# Patient Record
Sex: MALE | Race: BLACK OR AFRICAN AMERICAN | Employment: OTHER | URBAN - METROPOLITAN AREA
[De-identification: names, ages, dates, MRNs, and addresses within clinical notes are randomized per-mention and may not be internally consistent; named-entity substitution may affect disease eponyms.]

---

## 2019-06-13 ENCOUNTER — HOSPITAL ENCOUNTER (OUTPATIENT)
Dept: PHYSICAL THERAPY | Age: 74
Discharge: HOME | End: 2019-06-13
Payer: MEDICARE

## 2019-06-13 PROCEDURE — 97165 OT EVAL LOW COMPLEX 30 MIN: CPT

## 2019-06-13 PROCEDURE — 97161 PT EVAL LOW COMPLEX 20 MIN: CPT

## 2019-06-13 NOTE — PROGRESS NOTES
[facility] PHYSICAL THERAPY  [address] [place] [address] Eileen Finley, [address] - Phone: [phone]  Fax: [phone] / [address] [address] PHYSICAL THERAPY SERVICES  Patient Name: Jerlene Schaumann : 1945   Medical   Diagnosis: Gait instability [R26.81]  CVA (cerebrovascular accident) [facility]) [I63.9] Treatment Diagnosis: CVA with right sided weakness   Onset Date: 2019     Referral Source: Fred Alberts MD [place] [facility]): 2019   Prior Hospitalization: See medical history Provider #: [id number]   Prior Level of Function: Independent, exercising 3x/week at gym   Comorbidities: HTN, hyperlipidemia   Medications: Verified on Patient Summary List   The Plan of Care and following information is based on the information from the initial evaluation.   ===========================================================================================  Assessment / key information:  Patient is a 68y.o. year old male with chief complaint of decreased mobility in right side since CVA on 2019. Patient reports 1 fall since the CVA (reports he tripped over his toe) bu denies injury from fall. He does not use an assistive device to walk with. Functional limitations: 1) unable to swim for exercise, 2) recent fall. Objective findings: 1) decreased strength in B LE's (right > left), 2) impaired static standing balance, 3) score of 22/30 on the Functional Gait Assessment (FGA), indicating increased fall risk, 4) performs transfers independently. Patient will benefit from skilled PT services to address these issues. Thank you for your referral.    Jaclyn Gonzalez (Focused on Therapeutic Outcomes) Functional Status score = 78/100, which corresponds to a functional limitation of 22%.     Strength (MMT):                                          Hip L (1-5) R (1-5)   Hip Flexion 5 4   Hip Ext 3 3   Hip ABD 4 3-   Hip ADD 5 4      Knee L (1-5) R (1-5)   Knee Flexion 5 3+   Knee Extension 5 5   Ankle DF 5 5     Balance/ Equilibrium:       Eyes Open Eyes Closed   Romberg 30\" 30\"   Stance on Rail NT NT   Romberg on Foam 30\" 28\"                                                                                         Eyes Open              Eyes Closed                            Right            Left           Right             Left  Sharpened Romberg 5\" 10\" NT NT   Single Leg Stance 4\" 8\" NT NT     ===========================================================================================  Eval Complexity: History: LOW Complexity : Zero comorbidities / personal factors that will impact the outcome / POCExam:MEDIUM Complexity : 3 Standardized tests and measures addressing body structure, function, activity limitation and / or participation in recreation  Presentation: MEDIUM Complexity : Evolving with changing characteristics  Clinical Decision Making:LOW Complexity : FOTO score of 75-100Overall Complexity:LOW     Problem List: decrease ROM, decrease strength, impaired gait/ balance, decrease ADL/ functional abilitiies, decrease activity tolerance and decrease flexibility/ joint mobility   Treatment Plan may include any combination of the following: Therapeutic exercise, Therapeutic activities, Neuromuscular re-education, Physical agent/modality, Gait/balance training, Manual therapy and Patient education  Patient / Family readiness to learn indicated by: asking questions, trying to perform skills and interest  Persons(s) to be included in education: patient (P) and family support person (FSP);list spouse  Barriers to Learning/Limitations: None  Measures taken:    Patient Goal (s): \"improve mobility\"   Patient self reported health status: good  Rehabilitation Potential: excellent   Short Term Goals: To be accomplished in  2-3  weeks:  1.   Patient will demonstrate ability to perform ROM with eyes closed for 30\" to increase safety with ADL's.  2.  Increase FGA score to 24/30 to decrease fall risk. 3.  Patient will be compliant with home exercise program.   Long Term Goals: To be accomplished in  4-6  weeks:  1. Patient will demonstrate strength of > 4/5 with right hip abduction to increase dynamic stability with gait. 2.  Increase FGA score to 26/30 to decrease fall risk. 3.  Patient will be independent with home exercise program.  4.  Patient will increase FOTO Functional Status score to 84/100 to indicate decreased functional limitations. Frequency / Duration:   Patient to be seen  2  times per week for 4-6  weeks:  Patient / Caregiver education and instruction: self care    Therapist Signature: Dimitry Bolton PT Date: 5/26/8228   Certification Period: 6/13/2019 - 9/12/2019 Time: 11:08 AM   ===========================================================================================  I certify that the above Physical Therapy Services are being furnished while the patient is under my care. I agree with the treatment plan and certify that this therapy is necessary. Physician Signature:        Date:       Time:     Please sign and return to [facility] or you may fax the signed copy to [phone]. Thank you.

## 2019-06-13 NOTE — PROGRESS NOTES
PHYSICAL THERAPY - DAILY TREATMENT NOTE    Patient Name: Ariana Stoddard        Date: 2019  : 1945   YES Patient  Verified  Visit #:   1     Insurance: Payor: [place] MEDICARE / Plan: [place] MEDICARE PART A & B / Product Type: Medicare /      In time: 7:34 Out time: 8:26   Total Treatment Time: 46     Medicare Time Tracking (below)   Total Timed Codes (min):  0 1:1 Treatment Time:  0     TREATMENT AREA =  CVA with right sided weakness    SUBJECTIVE    Pain Level (on 0 to 10 scale):  0  / 10   Medication Changes/New allergies or changes in medical history, any new surgeries or procedures? NO    If yes, update Summary List   Subjective Functional Status/Changes:  []  No changes reported     States he had a CVA on 2019. States he went to hospital where he stayed for 3 days and was given TPA. States he was discharged home. Denies having any home health therapy or therapy in the hospital.  States he goes to the gym 3x/week - does resistance training and bike for cardio (calf raises, LAQ, seated hamstring)  Goals: improve mobility in right leg  He states he usually swims for exercises and he cannot kick with the right LE   He lives with wife in a 1 story home with no JACKIE. He reports ability to negotiate steps with a reciprocal gait. He reports 1 fall since CVA (about 3 weeks ago) - tripped over right toe. He denies any injury from fall. He does not use an assistive device. OBJECTIVE    Physical Therapy Evaluation - Neurologic    Posture: [] Poor    [x] Fair    [] Good    Describe:    Forward head, increased kyphosis, trunk flexion    Gait: [] Normal    [x] Abnormal    Device:    none  Describe:mildly slower speed, mildly decreased toe clearance on right   ROM:                                      AROM    PROM   Knee Left Right Left Right   Ext [facility]   Flex [facility] WFL             AROM                           PROM  Hip Left Right Left Right   Flex [facility] WFL   Ext [facility] WFL   ABD [facility]   ER WFL WFL dec dec   IR WFL WFL dec dec                                            AROM      PROM   Ankle Left Right Left Right   Ext [facility]   Flex [facility] WFL     Strength (MMT):                                          Hip L (1-5) R (1-5)   Hip Flexion 5 4   Hip Ext 3 3   Hip ABD 4 3-   Hip ADD 5 4   Hip ER     Hip IR       Knee L (1-5) R (1-5)   Knee Flexion 5 3+   Knee Extension 5 5   Ankle PF     Ankle DF 5 5   Other       Tone: WNL    Motor Control: WNL    Sensation:    Reflexes: [x] Not Tested   Left Right   Biceps (C5)     Brachioradiais (C6)     Triceps (C7)     Knee Jerk (L4)     Ankle Jerk (SI)       Balance/ Equilibrium:     Eyes Open Eyes Closed   Romberg 30\" 30\"   Stance on Rail NT NT   Romberg on Foam 30\" 28\"              Eyes Open         Eyes Closed                            Right            Left           Right             Left  Sharpened Romberg 5\" 10\" NT NT   Single Leg Stance 4\" 8\" NT NT     Functional Mobility      Bed Mobility:      Scooting: I        Rolling: I       Sit-Supine: I      Transfers:       Sit-Stand: I       Floor-Stand:       Gait:       Tandem:       Backwards:       Behavior: [x] Cooperative    [] Impulsive    [] Agitated    [] Perseverative    [] Confused   Oriented x:    Cognition: [] One Step Commands   [x] Multiple Commands   [] Displays Neglect [] R  [] L    Other:       Impaired Judgement: [] Y    [x] N      Impaired Vision:  [] Y    [x] N      Safety Awareness Deficits  [] Y    [x] N      Impaired Hearing  [] Y    [x] N      Able to Express Needs [x] Y    [] N    Optional Tests:       Dynamic Gait Index (24pt scale):        Functional Gait Assessment (30pt scale): 22/30       Wagner Balance Scale (56pt scale):       10 meter walk test: 13\" (correlates to a gait velocity of 0.77 m/sec which indicates he is a limited community ambulator)    Coordination Testing:       Disdiadochokinesia [] [facility]    [] Impaired    Describe: Heel - López  [] [facility]    [] Impaired    Describe:       FNF   [] [facility]    [] Impaired    Describe: Toe Tap   [] WFL    [] Impaired    Describe:    Oculomotor Tests: (Fixation Not Blocked)       Ocular ROM:   [] WFL    [] Limited    Describe:       Spontaneous Nystag. [] Neg     [] Pos    [] Left    [] Right       Gaze Holding Nystag.  [] Neg     [] Pos    [] Left    [] Right        Smooth Pursuit  [] Neg     [] Pos    [] Left    [] Right        Saccades   [] Neg     [] Pos    [] Left    [] Right        VOR - Slow Head Mvmt [] Neg     [] Pos    [] Left    [] Right        VOR - Fast Head Mvmt [] Neg     [] Pos    [] Left    [] Right        Head Thrust  [] Neg     [] Pos    [] Left    [] Right        Static Visual Acuity [] Neg     [] Pos    [] Left    [] Right        Dynamic Visual Acuity [] Neg     [] Pos    [] Left    [] Right      min Patient Education:  YES  Reviewed HEP   []  Progressed/Changed HEP based on:   Discussed POC     Other Objective/Functional Measures:    See eval     Post Treatment Pain Level (on 0 to 10) scale:   0  / 10     ASSESSMENT  Assessment/Changes in Function:     Justification for Eval Code Complexity:  Patient History (low 0, mod 1-2, high 3-4): low (HTN, h/o CVA)  Examination (low 1-2, mod 3+, high 4+): mod  Clinical Presentation (low stable or uncomplicated, mod evolving or changing, high unstable or unpredictable): mod  Clinical Decision Making (low , mod 26-74, high 1-25): FOTO = 78/100 low     []  See Progress Note/Recertification   Patient will continue to benefit from skilled PT services to modify and progress therapeutic interventions, address functional mobility deficits, address ROM deficits, address strength deficits, analyze and address soft tissue restrictions, analyze and cue movement patterns, analyze and modify body mechanics/ergonomics, assess and modify postural abnormalities, address imbalance/dizziness and instruct in home and community integration to attain remaining goals. Progress toward goals / Updated goals:    Goals established.       PLAN    [x]  Upgrade activities as tolerated YES Continue plan of care   []  Discharge due to :    []  Other:      Therapist: Erika Dorado PT    Date: 6/13/2019 Time: 7:39 AM     Future Appointments   Date Time Provider Kirill Chaudhry   6/13/2019  5:00 PM [facility]   6/20/2019  9:00 AM Bri [facility]

## 2019-06-13 NOTE — PROGRESS NOTES
[facility] PHYSICAL THERAPY  [address] [place] [address] Nicky Finley, [address] - Phone: [phone]  Fax: [phone] / 0626 Al[address] [facility] THERAPY SERVICES  Patient Name: Belle Cates : 1945   Medical   Diagnosis: Generalized muscle weakness [M62.81]  CVA (cerebrovascular accident) [facility]) [I63.9] Treatment Diagnosis: CVA   Onset Date: 19     Referral Source: Corrinne Hue, MD Start of Care [facility]): 2019   Prior Hospitalization: See medical history Provider #: [id number]   Prior Level of Function: Ind    Comorbidities: na   Medications: Verified on Patient Summary List   The Plan of Care and following information is based on the information from the initial evaluation.   ===========================================================================================  Assessment / key information: Patient is a 67 yo right handed male s/p left CVA. AROM right UE is WFL. He has a prior RCT with possible surgery pending. SH 4-/5; elbow and wrist 5/5. Right  76# left 67#. Right pinch 14-21# left 16-22#. Sensation intact. Tracking- mild difficulty with sustained tracking. He reports to be driving and was advised to be cleared by MD. No UE pain or edema. He is Ind with ADLs/IADLs including fine motor tasks. He reports some difficulty with writing but was able to write a short grocery list. No skilled OT needs. FOTO score: na  ===========================================================================================  Eval Complexity: History: LOW Complexity : Brief history review ; Examination: LOW Complexity : 1-3 performance deficits relating to physical, cognitive , or psychosocial skils that result in activity limitations and / or participation restrictions ;  Decision Making:LOW Complexity : No comorbidities that affect functional and no verbal or physical assistance needed to complete eval tasks   Problem List: Other na  Patient / Family readiness to learn indicated by: asking questions, trying to perform skills and interest  Persons(s) to be included in education: patient (P) and family support person (FSP);list wife  Barriers to Learning/Limitations: None  Measures taken: na   Patient Goal (s): Improve mobility    Patient self reported health status: good  Rehabilitation Potential: excellent    Evaluation only. PT to address balance and mobility. Patient / Caregiver education and instruction: other safety with driving    Therapist Signature: HIRAM Barroso Date: 6/93/2396   Certification Period: na Time: 3:45 PM   ===========================================================================================  I certify that the above Occupational Therapy Services are being furnished while the patient is under my care. I agree with the treatment plan and certify that this therapy is necessary. Physician Signature:        Date:       Time:     Please sign and return to [facility] or you may fax the signed copy to [phone]. Thank you.

## 2019-06-13 NOTE — PROGRESS NOTES
OCCUPATIONAL THERAPY - DAILY TREATMENT NOTE    Patient Name: Letty Orr        Date: 2019  : 1945   YES Patient  Verified  Visit #:   1   of   1  Insurance: Payor: [place] MEDICARE / Plan: [place] MEDICARE PART A & B / Product Type: Medicare /      In time: 2:30 Out time: 3:20   Total Treatment Time: 50     Medicare/BCBS Time Tracking (below)   Total Timed Codes (min):  na 1:1 Treatment Time:  na     TREATMENT AREA =  Right arm    SUBJECTIVE    Pain Level (on 0 to 10 scale):  0  / 10   Medication Changes/New allergies or changes in medical history, any new surgeries or procedures? NO    If yes, update Summary List   Subjective Functional Status/Changes:  []  No changes reported     I'm driving. OBJECTIVE     min Therapeutic Exercise:  [x]  See flow sheet        min Patient Education:  NO  Reviewed HEP   []  Progressed/Changed HEP based on: Other Objective/Functional Measures:    See initial evaluation for details      Post Treatment Pain Level (on 0 to 10) scale:   0  / 10     ASSESSMENT  Assessment/Changes in Function:     Patient is near/at baseline with ADLs/IADLs and does not have skilled OT needs at this time.   OT Eval Complexity Justification:  Patient History: Low-CVA  Examination : Low-writing   Clinical Decision Making: low- desires return to full activity        [x]  See Progress Note/Recertification   Patient will continue to benefit from skilled OT services to    Progress toward goals / Updated goals:    Evaluation only     PLAN  []  Upgrade activities as tolerated NO Continue plan of care   [x]  Discharge due to : Eval only   []  Other:      Therapist: ANDRA Molina/AYAZ    Date: 2019 Time: 3:44 PM

## 2019-06-20 ENCOUNTER — HOSPITAL ENCOUNTER (OUTPATIENT)
Dept: PHYSICAL THERAPY | Age: 74
Discharge: HOME | End: 2019-06-20
Payer: MEDICARE

## 2019-06-20 PROCEDURE — 92523 SPEECH SOUND LANG COMPREHEN: CPT

## 2019-06-20 NOTE — PROGRESS NOTES
[facility]  [address]  Ph: [phone]  Fax: [phone]    Plan of Care/ Statement of Necessity for Speech Therapy Services    Patient name: Geena Baltazar Start of Care: 2019   Referral source: Tiago Briceño MD : 1945    Medical Diagnosis: Dysarthria [R47.1]  CVA (cerebrovascular accident) [facility]) [I63.9]   Onset Date:2019    Treatment Diagnosis: Dysarthria   Prior Hospitalization: see medical history Provider#: [id number]   Medications: Verified on Patient summary List    Comorbidities: CVA, hx of [place] Palsy, high blood pressure   Prior Level of Function: independent, lives with wife, retired [place]    The [address] [place] [address] and following information is based on the information from the initial evaluation. Assessment/ key information:   Patient is a pleasant 68year old male referred for speech and language services after he suffered a stroke 2019. The patient is retired [employer], currently helps his son and does yardwork. The patient complains of slur in speech, especially in the afternoon/evenings with wife stating ~50% intelligible in the evenings. The wife states his speech becomes lower pitch, slurred, and slow rate. The patient did not receive skilled ST srevices since the hospital. The wife endorses \"a little memory loss\" since the stroke. Further, the pateitn reports intermittent difficulty swallowing. The [place] Dysarthria Assessment 2nd Edition (FDA-2) was administered. Results are scored by a rating scale form from an A (normal function) to an E (no function) by 7 sections, including:  Reflexes, Respiration, lips, Palate, Laryngeal, Tongue, and Intelligibility. Results are as indicated:     Reflexes-  Cough B Swallow A-B, Dribble/Drool B  Respiration: At Rest A, At [facility]  Lips:  At Rest B, Spread B-C, Seal A, Alternate A-B, In speech B  Palate: Fluids A, Maintenance A, In-Speech A  Laryngeal: Time A-B, Pitch A-B, Volume C ,  In speech B-C ,  Tongue: At rest: B, Protrusion: B-C, Elevation: C, Lateral: C; Alternate: A-B, In Speech:  B  Intelligibility: Words: A, Sentences: A, Conversation: A-B     A= Normal for age, B = Mild abnormality noticeable to skilled observer, C= Abnormality obvious but can perform task/movements with reasonable approximation, D= Some production of task but poor in quality, unable to sustain, inaccurate or extremely labored, E= Unable to undertake/ movement/ sound. The patient presents with mild dysarthria c/b poor left labial movement results in poor plosive and labial seal in speech, poor lingual movement to the right resulting in incoordination of lingual movements in speech. The patient was also assessed with the Cognitive Linguistic Quick Test de to compliant of memory difficulty. The patient did demonstrate difficulty with story retelling, however overall attention, memory, and visuospatial skills fell WNL, Executive Functioning and Language with mild deficits. Overall cognitive functioning WNL with a composite severity rating of 3.6/4. The patient would benefit from skilled ST services to address motor speech for improved communication at all times of day of night across a variety of communication partners, introduce memory enhancing techniques for use at home, and for diagnostic therapy to assess swallow function.       Problem List:   Dysarthria, Dysphagia and Impaired Cognition    Treatment Plan may include any combination of the following: Cognitive/Language Treatment, Dysarthria Treatment and Dysphagia Treatment      Patient / Family readiness to learn indicated by: asking questions and trying to perform skills    Persons(s) to be included in education:   patient (P) and family support person (FSP);list wife    Barriers to Learning/Limitations: yes;  cognitive    Patient Goal (s): To make my speech more clear    Patient Self Reported Health Status: good    Rehabilitation Potential: good    Short Term Goals: To be accomplished in 8-10 treatments  Pt will:  1. Produce 3+ syllable words with 90% intelligibility using speech intelligibility strategies with min A.  2. Produce sentences using multi-syllabic target words with 80% intelligibility using speech strategies (slow rate, overarticulation, increased volume) with min A  3. Produce passages utilizing speech strategies (slow rate, overarticulation, increased volume) with 80% intelligibility with Alba  4. Utilize compensatory strategies (decrease rate, overarticulate, increase intensity) to increase intelligibility to 90% at conversation level with min visual/verbal cues in 4/5 trials. 5. Perform oral motor exercises (with and without resistance) in therapy and at home to increase oral motor strength/range-of-motion for articulation tasks with min visual/verbal cues in 4/5 trials. 6. Complete diagnostic therapy for complaints of dysphagia within 3 sessions with results/recommendations to follow. 7. Demonstrate comprehension of internal/external memory enhancing techniques in 4/5 opportunities Alba    Long Term Goals: To be accomplished in 10-12 weeks   1. Patient will demonstrate functional increase in articulation skills for effective participation in communication ADLs with min A-mod I.    2. Tolerate safest, least restrictive diet for improved safety while eating in 9/10 opportunities  3. Demonstrate understanding of memory enhancing techniques for improved cognitive functioning Iliana       Frequency / Duration: Patient to be seen 1-2 times per week for 4 weeks:     The severity rating is based on the following outcomes:    National Outcomes Measures (NOMS)   Cognitive Linguistic Quick Test  Professional Judgement    Patient/ Caregiver education and instruction: Diagnosis, prognosis, Compensatory Techniques    Certification Period: 6/20/19-7/19/19    Adiel Graf [facility] SLP 6/20/2019 1:53 PM  ______________________________________________________________________    I certify that the above Therapy Services are being furnished while the patient is under my care. I agree with the treatment plan and certify that this therapy is necessary. Physician's Signature:____________________  Date:___________Time:_________    Please sign and return to [facility].   Ph: [phone]  Fax: [phone]

## 2019-06-20 NOTE — PROGRESS NOTES
ST DAILY TREATMENT NOTE    Patient Name: Alyx Schmid  Date:2019  : 1945  [x]  Patient  Verified  Payor: Payor: [place] MEDICARE / Plan: VA MEDICARE PART A & B / Product Type: Medicare /   In time:0900  Out time:1000  Total Treatment Time (min): 0  Visit #: 1 of 8-10    Treatment Diagnosis: Dysarthria [R47.1]  CVA (cerebrovascular accident) ([facility] [address] [I63.9]    SUBJECTIVE  Pain Level (0-10 scale): 0  Any medication changes, allergies to medications, adverse drug reactions, diagnosis change, or new procedure performed?: [x] No    [] Yes (see summary sheet for update)    Subjective functional status/changes:   [] No changes reported  Patient agreeable to evaluation, eager to praticipate    OBJECTIVE  Treatment provided includes:  Increase/Improve:  []  Voice Quality []  Cognitive Linguistic Skills []  Laryngeal/Pharyngeal Exercises   []  Vocal Loudness []  Reading Comprehension []  Swallowing Skills    []  Vocal Cord Function []  Auditory Comprehension []  Oral Motor Skills   []  Resonance []  Writing Skills []  Compensatory strategies    []  Speech Intelligibility []  Expressive Language []  Attention   []  Breath Support/Coord. []  Receptive language []  Memory   []  Articulation []  Safety Awareness []    []  Fluency []  Word Retrieval []        Treatment Provided:  -no therapy initiated this day.      Patient/Caregiver  Education: [x] Review HEP      HEP/Handouts given: No HEP  Initiated this day, will initiate with therapy    Pain Level (0-10 scale) post treatment: 0    ASSESSMENT   []   Improving appropriately and progressing toward goals  []   Improving slowly and progressing toward goals  []   Approximating goals/maximum potential  [x]   Continues to benefit from skilled therapy to address remaining functional deficits  []   Not progressing toward goals and plan of care will be adjusted    Patient will continue to benefit from skilled therapy to address remaining functional deficits: dysarthria    Progress towards goals / Updated goals:  Patient participated in evaluation with results indicating mild dysarthria. Need to evaluate swallow function, will address within three sessions    PLAN  [x]  Continue plan of care  []  Modify Goals/Treatment Plan      []  Discharge due to:  [] Other:    Short-Term Goals:  Pt will:  1. Produce 3+ syllable words with 90% intelligibility using speech intelligibility strategies with min A.  2. Produce sentences using multi-syllabic target words with 80% intelligibility using speech strategies (slow rate, overarticulation, increased volume) with min A  3. Produce passages utilizing speech strategies (slow rate, overarticulation, increased volume) with 80% intelligibility with Alba  4. Utilize compensatory strategies (decrease rate, overarticulate, increase intensity) to increase intelligibility to 90% at conversation level with min visual/verbal cues in 4/5 trials. 5. Perform oral motor exercises (with and without resistance) in therapy and at home to increase oral motor strength/range-of-motion for articulation tasks with min visual/verbal cues in 4/5 trials. 6. Complete diagnostic therapy for complaints of dysphagia within 3 sessions with results/recommendations to follow.   7. Demonstrate comprehension of internal/external memory enhancing techniques in 4/5 opportunities [id number] SLP 6/20/2019  2:20 PM    Future Appointments   Date Time Provider Kirill Chaudhry   6/25/2019  8:00 AM [facility]   6/25/2019  9:30 AM Milana Vicky, PT [facility]   7/2/2019 10:30 AM Milana Agent, PT [facility]   7/2/2019 11:30 AM [facility]   7/9/2019  8:30 AM [facility]   7/9/2019  9:30 AM Milana Vicky, PT [facility]   7/16/2019  8:30 AM [facility]   7/16/2019  9:30 AM Milana Vicky, [facility]   7/23/2019  8:30 AM [facility]   7/23/2019  9:30 AM Theresa Mary, PT [facility]   7/30/2019  9:30 AM Marley ZHENG [facility]   7/30/2019 10:30 AM Roxie Snellen, PT [facility]

## 2019-06-25 ENCOUNTER — HOSPITAL ENCOUNTER (OUTPATIENT)
Dept: PHYSICAL THERAPY | Age: 74
Discharge: HOME | End: 2019-06-25
Payer: MEDICARE

## 2019-06-25 PROCEDURE — 97112 NEUROMUSCULAR REEDUCATION: CPT

## 2019-06-25 PROCEDURE — 92507 TX SP LANG VOICE COMM INDIV: CPT

## 2019-06-25 PROCEDURE — 97110 THERAPEUTIC EXERCISES: CPT

## 2019-06-25 NOTE — PROGRESS NOTES
PHYSICAL THERAPY - DAILY TREATMENT NOTE    Patient Name: Laurent Kaminski        Date: 2019  : 1945   YES Patient  Verified  Visit #:   2   of     Insurance: Payor: VA MEDICARE / Plan: VA MEDICARE PART A & B / Product Type: Medicare /      In time: 9:30 Out time: 10:26   Total Treatment Time: 56     Medicare/BCBS [place] Time Tracking (below)   Total Timed Codes (min):  56 1:1 Treatment Time:  56     TREATMENT AREA =  Dysarthria [R47.1]  CVA (cerebral vascular accident) ([facility] [address] [I63.9]  SUBJECTIVE    Pain Level (on 0 to 10 scale):  0  / 10   Medication Changes/New allergies or changes in medical history, any new surgeries or procedures? NO    If yes, update Summary List   Subjective Functional Status/Changes:  []  No changes reported     Pt denies pain or dizziness, but reports occasionally feeling off-balance, especially with bending forward and returning to stand. OBJECTIVE    29 min Therapeutic Exercise:  [x]  See flow sheet   Rationale:      increase ROM, increase strength, improve coordination, improve balance and increase proprioception to improve the patients ability to perform ADLs/IADLs, functional mobility and gait safely and independently without increased pain/symptoms     27 min Neuromuscular Re-ed: Oculomotor exam, saccades exercise, EO/EC balance on floor/foam per flowsheet, tap-ups   Rationale: improve balance to improve the patient's ability to perform ADLs/IADLs, functional mobility and gait safely and independently without increased pain/symptoms       During TE/NM min Patient Education:  YES  Reviewed HEP   [x]  Progressed/Changed HEP based on:   Initiated HEP, copy in chart     Other Objective/Functional Measures:    Oculomotor Tests: (Fixation Not Blocked)       Ocular ROM:                     [x] [facility]    [] Limited              Describe:       Spontaneous Nystag. [x] Neg     [] Pos    [] Left    [] Right       Gaze Holding Nystag.       [x] Neg     [] Pos    [] Left    [] Right        Smooth Pursuit                [] Neg     [x] Pos    [] Left    [] Right        Saccades                          [] Neg     [x] Pos    [] Left    [] Right        VOR - Slow Head Mvmt   [] Neg     [x] Pos    [] Left    [] Right        VOR - Fast Head Mvmt    [] Neg     [] Pos    [] Left    [] Right        Head Thrust                      [] Neg     [] Pos    [] Left    [] Right        Static Visual Acuity         [] Neg     [] Pos    [] Left    [] Right        Dynamic Visual Acuity    [] Neg     [] Pos    [] Left    [] Right      Initiated saccades exercises, EO/EC balance on floor/foam, tap-ups, NS, stand hip 3-way, mini-squats, step-ups, bridge, clam     Post Treatment Pain Level (on 0 to 10) scale:   0  / 10     ASSESSMENT    Assessment/Changes in Function:     Tolerated exercises without complaints     []  See Progress Note/Recertification   Patient will continue to benefit from skilled PT services to modify and progress therapeutic interventions, address functional mobility deficits, address ROM deficits, address strength deficits, analyze and address soft tissue restrictions, analyze and cue movement patterns, analyze and modify body mechanics/ergonomics, assess and modify postural abnormalities, address imbalance/dizziness and instruct in home and community integration to attain remaining goals. Progress toward goals / Updated goals:    Progressing toward goals: · Short Term Goals: To be accomplished in  2-3  weeks:  1. Patient will demonstrate ability to perform ROM with eyes closed for 30\" to increase safety with ADL's. - MET ( MSR bun EC = 30\"B)  2. Increase FGA score to 24/30 to decrease fall risk. 3.  Patient will be compliant with home exercise program.  · Long Term Goals: To be accomplished in  4-6  weeks:  1. Patient will demonstrate strength of > 4/5 with right hip abduction to increase dynamic stability with gait. - Initiated stand hip abd, step-ups, clam  2.   Increase FGA score to 26/30 to decrease fall risk. 3.  Patient will be independent with home exercise program.  4.  Patient will increase FOTO Functional Status score to 84/100 to indicate decreased functional limitations.        PLAN    [x]  Upgrade activities as tolerated YES Continue plan of care   []  Discharge due to :    []  Other:      Therapist: Katie Mclean PT    Date: 6/25/2019 Time: 9:43 AM     Future Appointments   Date Time Provider Kirill Chaudhry   7/2/2019 10:30 AM Pavan Sage PT [facility]   7/2/2019 11:30 AM C[facility]   7/9/2019  [facility]   7/9/2019  9:30 AM Pavan Sage PT [facility]   7/16/2019  8:[facility]   7/16/2019  9:30 AM Pavan Sage PT [facility]   7/23/2019  8:30 AM [facility]   7/23/2019  9:30 AM Pavan Sage PT [facility]   7/30/2019  9:30 AM [facility]   7/30/2019 10:30 AM Ofelia Sosa PT [facility]

## 2019-06-25 NOTE — PROGRESS NOTES
ST DAILY TREATMENT NOTE    Patient Name: Briana Galeano  Date:2019  : 1945  [x]  Patient  Verified  Payor: Payor: Senait Granger / Plan: VA MEDICARE PART A & B / Product Type: Medicare /   In [id number]  Out [id number]  Total Treatment Time (min): 30  Visit #: 2 of 8-10    Treatment Diagnosis: Dysarthria [R47.1]  CVA (cerebral vascular accident) ([facility] [address] [I63.9]    SUBJECTIVE  Pain Level (0-10 scale): 0  Any medication changes, allergies to medications, adverse drug reactions, diagnosis change, or new procedure performed?: [x] No    [] Yes (see summary sheet for update)    Subjective functional status/changes:   [x] No changes reported  Patient attended therapy with wife in the room. OBJECTIVE  Treatment provided includes:  Increase/Improve:  []  Voice Quality []  Cognitive Linguistic Skills []  Laryngeal/Pharyngeal Exercises   []  Vocal Loudness []  Reading Comprehension []  Swallowing Skills    []  Vocal Cord Function []  Auditory Comprehension [x]  Oral Motor Skills   []  Resonance []  Writing Skills []  Compensatory strategies    [x]  Speech Intelligibility []  Expressive Language []  Attention   []  Breath Support/Coord.  []  Receptive language [x]  Memory   [x]  Articulation []  Safety Awareness []    []  Fluency []  Word Retrieval []        Treatment Provided:  -introduced patient to memory enhancing techniques  -introduced patient to oral motor exercises, completed with mod-maxA  -introduced patient to motor speech strategies, provided handout of 2-3 syllable words    Patient/Caregiver  Education: [x] Review HEP      HEP/Handouts given: oral motor exercises, 2-3 syllable words in sentences etc, report use of memory enhancing techniques    Pain Level (0-10 scale) post treatment: 0    ASSESSMENT   []   Improving appropriately and progressing toward goals  [x]   Improving slowly and progressing toward goals  []   Approximating goals/maximum potential  [x]   Continues to benefit from skilled therapy to address remaining functional deficits  []   Not progressing toward goals and plan of care will be adjusted    Patient will continue to benefit from skilled therapy to address remaining functional deficits: dysarthria    Progress towards goals / Updated goals:  Patient is demonstrating steady progress towards goals. Explained results of CLQT being WNL for patient's age. SLP reviewed memory enhancing techniques for patient to utilize at home; verbalized comprehension. SLP provided motor speech techniques to include Speak Loud, Overarticulate, and Slow down, to improve motor speech. Provided oral motor exercises, benefit from visual feedback and verbal cues. Patient demonstrated comprehension of all exercises and techniques; all exercises to be completed at night when speech is at its worse, as well as in the morning. PLAN  [x]  Continue plan of care  []  Modify Goals/Treatment Plan      []  Discharge due to:  [] Other:    Short-Term Goals:  Pt will:  1. Produce 3+ syllable words with 90% intelligibility using speech intelligibility strategies with min A.  2. Produce sentences using multi-syllabic target words with 80% intelligibility using speech strategies (slow rate, overarticulation, increased volume) with min A  3. Produce passages utilizing speech strategies (slow rate, overarticulation, increased volume) with 80% intelligibility with Alba  4. Utilize compensatory strategies (decrease rate, overarticulate, increase intensity) to increase intelligibility to 90% at conversation level with min visual/verbal cues in 4/5 trials. 5. Perform oral motor exercises (with and without resistance) in therapy and at home to increase oral motor strength/range-of-motion for articulation tasks with min visual/verbal cues in 4/5 trials. 6. Complete diagnostic therapy for complaints of dysphagia within 3 sessions with results/recommendations to follow.   7. Demonstrate comprehension of internal/external memory enhancing techniques in 4/5 opportunities [facility] 6/25/2019  8:36 AM    Future Appointments   Date Time Provider Kirill Chaudhry   6/25/2019  9:30 AM Alisa Singh PT [facility]   7/2/2019 10:30 AM Alisa Singh PT [facility]   7/2/2019 [facility]:30 [facility]   7/9/2019  8:30 AM [facility]   7/9/2019  9:30 AM Alisa Singh PT [facility]   7/16/2019  8:30 AM [facility]   7/16/2019  9:30 AM Alisa Singh PT [facility]   7/23/2019  8:30 AM [facility]   7/23/2019  9:30 AM Alisa Singh PT [facility]   7/30/2019  9:30 AM [facility]   7/30/2019 10:30 AM Nathaniel Hanley PT [facility]

## 2019-07-02 ENCOUNTER — HOSPITAL ENCOUNTER (OUTPATIENT)
Dept: PHYSICAL THERAPY | Age: 74
Discharge: HOME | End: 2019-07-02
Payer: MEDICARE

## 2019-07-02 PROCEDURE — 97110 THERAPEUTIC EXERCISES: CPT

## 2019-07-02 PROCEDURE — 92610 EVALUATE SWALLOWING FUNCTION: CPT

## 2019-07-02 PROCEDURE — 97112 NEUROMUSCULAR REEDUCATION: CPT

## 2019-07-02 PROCEDURE — 92526 ORAL FUNCTION THERAPY: CPT

## 2019-07-02 NOTE — PROGRESS NOTES
PHYSICAL THERAPY - DAILY TREATMENT NOTE    Patient Name: Gabe Humphreys        Date: 2019  : 1945   YES Patient  Verified  Visit #:   3     Insurance: Payor: VA MEDICARE / Plan: VA MEDICARE PART A & B / Product Type: Medicare /      In time: 10:30 Out time: 11:26   Total Treatment Time: 56     Medicare/BCBS [place] Time Tracking (below)   Total Timed Codes (min):  56 1:1 Treatment Time:  56     TREATMENT AREA =  Dysarthria [R47.1]  CVA (cerebral vascular accident) ([facility] [address] [I63.9]  SUBJECTIVE    Pain Level (on 0 to 10 scale):  0  / 10   Medication Changes/New allergies or changes in medical history, any new surgeries or procedures? NO    If yes, update Summary List   Subjective Functional Status/Changes:  []  No changes reported     Pt report that he twisted his left ankle while mowing the lawn last week. Pt reports that he has been resting and applying ice and it is feeling better now.         OBJECTIVE    33 min Therapeutic Exercise:  [x]  See flow sheet   Rationale:      increase ROM, increase strength, improve coordination, improve balance and increase proprioception to improve the patients ability to perform ADLs/IADLs, functional mobility and gait safely and independently without increased pain/symptoms     23 min Neuromuscular Re-ed: EO/EC balance, tap-ups per flowsheet   Rationale: improve balance to improve the patient's ability to perform ADLs/IADLs, functional mobility and gait safely and independently without increased pain/symptoms      During TE min Patient Education:  YES  Reviewed HEP   []  Progressed/Changed HEP based on:   Cont HEP     Other Objective/Functional Measures:    Exercises per flowsheet  Increased reps tap-ups, added grapevine tap-ups  Progressed EO/EC balance per flowsheet  Increased reps bridge with ball, increased reps/resistance S/L clam  Added prone hip ext     Post Treatment Pain Level (on 0 to 10) scale:   0  / 10     ASSESSMENT    Assessment/Changes in Function:     Tolerated exercises without complaints     []  See Progress Note/Recertification   Patient will continue to benefit from skilled PT services to modify and progress therapeutic interventions, address functional mobility deficits, address ROM deficits, address strength deficits, analyze and address soft tissue restrictions, analyze and cue movement patterns, analyze and modify body mechanics/ergonomics, assess and modify postural abnormalities, address imbalance/dizziness and instruct in home and community integration to attain remaining goals. Progress toward goals / Updated goals:    Progressing toward goals: · Short Term Goals: To be accomplished in  2-3  weeks:  1.  Patient will demonstrate ability to perform ROM with eyes closed for 30\" to increase safety with ADL's.   2.  Increase FGA score to 24/30 to decrease fall risk. 3.  Patient will be compliant with home exercise program. - Partial compliance due to left ankle injury  · Long Term Goals: To be accomplished in  4-6  weeks:  1.  Patient will demonstrate strength of > 4/5 with right hip abduction to increase dynamic stability with gait. - Increased reps/resistance S/L clam  2.  Increase FGA score to 26/30 to decrease fall risk. 3.  Patient will be independent with home exercise program.  4.  Patient will increase FOTO Functional Status score to 84/100 to indicate decreased functional limitations.        PLAN    [x]  Upgrade activities as tolerated YES Continue plan of care   []  Discharge due to :    []  Other:      Therapist: Gamaliel Swenson PT    Date: 7/2/2019 Time: 10:43 AM     Future Appointments   Date Time Provider Kirill Chaudhry   7/2/2019 11:30 AM [facility]   7/9/2019  8:30 AM Raimundo Clifton [facility]   7/9/2019  9:30 AM Lin Fuller PT [facility]   7/16/2019  8:30 AM Sydney Graf [facility]   7/16/2019  9:30 AM Lin Fuller PT [facility]   7/23/2019  8:30 AM Homar Tayla Noble [facility]   7/23/2019  9:30 AM Mariza Arvizu, PT [facility]   7/30/2019  9:30 AM Fernanda ZHENG [facility]   7/30/2019 10:30 AM Valentino Malady, PT [facility]

## 2019-07-02 NOTE — PROGRESS NOTES
ST DAILY TREATMENT NOTE    Patient Name: Rodrigo Garcia  Date:2019  : 1945  [x]  Patient  Verified  Payor: Payor: [place] MEDICARE / Plan: [place] MEDICARE PART A & B / Product Type: Medicare /   In time:1126  Out time:1200  Total Treatment Time (min): 34  Visit #: 3 of 8-10    Treatment Diagnosis: Dysarthria [R47.1]  CVA (cerebral vascular accident) ([facility] [address] [I63.9]    SUBJECTIVE  Pain Level (0-10 scale): 0  Any medication changes, allergies to medications, adverse drug reactions, diagnosis change, or new procedure performed?: [x] No    [] Yes (see summary sheet for update)    Subjective functional status/changes:   [x] No changes reported    OBJECTIVE  Treatment provided includes:  Increase/Improve:  []  Voice Quality []  Cognitive Linguistic Skills []  Laryngeal/Pharyngeal Exercises   []  Vocal Loudness []  Reading Comprehension [x]  Swallowing Skills    []  Vocal Cord Function []  Auditory Comprehension []  Oral Motor Skills   []  Resonance []  Writing Skills []  Compensatory strategies    []  Speech Intelligibility []  Expressive Language []  Attention   []  Breath Support/Coord.  []  Receptive language []  Memory   []  Articulation []  Safety Awareness []    []  Fluency []  Word Retrieval []        Treatment Provided:  Lips:  [x]Symmetrical []asymmetrical  Retraction []WFL  [x]?min []?mod []?max  Protrusion [x]WFL  []?min []?mod []?max  Strength []WFL  [x]?min []?mod []?max  Puff  []WFL  [x]?min []?mod []?max  Tongue:  [x]Symmetrical []asymmetrical  Protrusion [x]WFL  []?min []?mod []?max  Elevation [x]WFL  []?min []?mod []?max  Depression [x]WFL  []?min []?mod []?max  Lateralization [x]WFL  []?min []?mod []?max  Strength [x]WFL  []?min []?mod []?max  Velum:  [x]Symmetrical []asymmetrical  Gag Reflex:  []Present []absent [x]DNT  Sensation:  [x]Intact []Diminished  Specify:   Voice:  [x]Normal []Hoarse []harsh  []Breathy []Hypernasal []hyponasal  []Gurgly Other: Swallow:  [x]Volitional []absent  [x]Reflexive []absent  Cough   Strength : [x]WNL []Diminished  [x]Volitional []absent  [x]Reflexive []absent    OBJECTIVE  OP SWALLOW EVALUATION    Observation of Swallow:  Thin Solids  Mechanical soft fruit and grain bar/regular peanut butter cracker nabs   Oral Phase [facility] WFL   Anterior loss of bolus  (decreased labial seal [])     Decreased bolus formation  (?lingual ROM/Coord[])     Increased mastication     Increased oral transit time  (?A-P bolus transit[])     Abnormal chewing     Tongue pumping/tremors     Pocketing  (?labial/buccal tension/lingual control)     Other     Oral Pharyngeal Phase WFL    Delayed swallow initiation  X   Absent swallow     Stasis on lingual surface  (?lingual movement)     Adherence to hard palate   (? lingual elevation)     Pharyngeal Phase [facility] WFL   Multiple swallows  (residue in pharynx [])     Coughing post swallow     Throat clear post swallow     Wet vocal quality  (residue on vocal cords [])     Reduced laryngeal elevation     Nasal Regurgitation  (? velopharyngeal closure)     Other       [] No symptoms of dysphagia evidenced  [x] Symptoms of dysphagia observed  [] Patient at risk for aspiration  [] Other:     Recommendations:  Diet:  [] NPO    [] Pureed [] Ground [] MechSoft [x] Regular  Liquids: [] Water  [x] Regular [] Thickened   [] Yeguada  [] Honeythick  [] Pudding  Presentation: [] Cup    [] Spoon [] Straw [] Alternate liquids and solids  Monitor: [x] Sitting up at 90 deg [] Reclined to:  [] Head turned to:    [] Chin tuck  [] Head tilt to:      [] Seated upright post meals (min):  Document: [] Coughing [] Temperatures [] Lung Sounds    Videoflouroscopy:  [] Yes [x] No  Dysphagia Treatment: [x] Yes [] No Sessions per week:1  Other:    Remediation Techniques:  C = Compensatory techniques to use during meal      F = Facilitation/treatment techniques by SLP    [] Supraglottic Swallow (c,f)    [] Oral motor exercises (f)  [] Super-supraglottic swallow (c,f)   [] Labial closure  [] Compensations for pocketing (c)   [] Lingual elevation  [] Sweep mouth with tongue    [] Lingual lateralization  [] Sweep mouth with finger    [] Lingual anterior-posterior  [] External pressure to check   [] Lingual base of tongue  [] Rinse mouth/expel after meal   [] Vocal Fold Exercises (f)  [x] Alternate liquid swallows every _1 bites (c)  [] Falsetto/laryngeal elevation exercises (f)  [] Discourage liquid wash between bites (c)  [] Thermal application (c,f)  [] Multiple swallows     [] Sour bolus (f)  [] Patient needs cues     [] Cold bolus (f)  [] Patient does not need cues   [] Pharyngeal exercise (f)  [] Mendelsohn Maneuver (c,f)    [] Breath hold  [] Encourage/stimulate lip closure (c)   [] Effortful Swallow (c,f)  [] Empty mouth before next bite (c)   [] Tongue base retraction  [x] Cue patient to slow down (c)    [] Tongue hold  [] Encourage coughing (c)    [] Laryngeal closure  []Chin tuck (c)  []Head turn  (c)  []Head turn , chin tuck (c)      Patient/Caregiver  Education: [x] Review HEP      HEP/Handouts given: safe swallowing guidlines    Pain Level (0-10 scale) post treatment: 0    ASSESSMENT   []   Improving appropriately and progressing toward goals  [x]   Improving slowly and progressing toward goals  []   Approximating goals/maximum potential  [x]   Continues to benefit from skilled therapy to address remaining functional deficits  []   Not progressing toward goals and plan of care will be adjusted    Patient will continue to benefit from skilled therapy to address remaining functional deficits: dysphagia, dysarthria    Progress towards goals / Updated goals:  Pt is demonstrating steady progress towards goals. Pt participated in swallow eval with results indicating mild oropharyngeal dysphagia c/b swallow delay (~5 seconds) with solid trial. Mastication, propulsion, and bolus clearance WFL. 0 s/sx of aspiration across all trials.  Recommend regular, thin liquid diet with safe swallowing guidelines . Patient educated on importance of safe swallowing guidelines (small bites/sips, decreased rate, alt solids/liquids, HOB >60 for all PO intake); verbalized and demonstrated comprehension. PLAN  [x]  Continue plan of care  []  Modify Goals/Treatment Plan      []  Discharge due to:  [] Other:    Short-Term Goals:  Pt will:  1. Produce 3+ syllable words with 90% intelligibility using speech intelligibility strategies with min A.  2. Produce sentences using multi-syllabic target words with 80% intelligibility using speech strategies (slow rate, overarticulation, increased volume) with min A  3. Produce passages utilizing speech strategies (slow rate, overarticulation, increased volume) with 80% intelligibility with Alba  4. Utilize compensatory strategies (decrease rate, overarticulate, increase intensity) to increase intelligibility to 90% at conversation level with min visual/verbal cues in 4/5 trials. 5. Perform oral motor exercises (with and without resistance) in therapy and at home to increase oral motor strength/range-of-motion for articulation tasks with min visual/verbal cues in 4/5 trials. 6. Complete diagnostic therapy for complaints of dysphagia within 3 sessions with results/recommendations to follow. -MET  7. Demonstrate comprehension of internal/external memory enhancing techniques in 4/5 opportunities Alba  8. Demonstrate ability to utilize aspiration/reflux precautions and compensatory strategies (double swallow; diet consistency adjustment; alternate liquids/solids; small sips/bites, meds crushed) to decrease the reported incidences of dysphagia and s/sx of aspiration with min A with visual/verbal cues in 3/3 meal trials. Long Term Goals: To be accomplished in 2-4 weeks   Pt will:   1.  Tolerate L.R.D. to facilitate maintenance of hydration/nutrition needs without overt s/sx of aspiration in 3/5 trials with min A with visual/verbal cues.           Farhat Aguirre [facility] 7/2/2019  12:04 PM    Future Appointments   Date Time Provider Kirill Chaudhry   7/9/2019  8:30 AM Julien [facility]   7/9/2019  9:30 AM Liz Mcgarry, PT [facility]   7/16/2019  8:30 AM Julien [facility]   7/16/2019  9:30 AM Liz Mcgarry PT [facility]   7/23/2019  8:30 AM Julien [facility]   7/23/2019  9:30 AM Liz Mcgarry PT [facility]   7/30/2019  9:30 AM Julien [facility]   7/30/2019 10:30 AM Kiara Crocker, PT [facility]

## 2019-07-09 ENCOUNTER — HOSPITAL ENCOUNTER (OUTPATIENT)
Dept: PHYSICAL THERAPY | Age: 74
Discharge: HOME | End: 2019-07-09
Payer: MEDICARE

## 2019-07-09 PROCEDURE — 97530 THERAPEUTIC ACTIVITIES: CPT

## 2019-07-09 PROCEDURE — 92526 ORAL FUNCTION THERAPY: CPT

## 2019-07-09 PROCEDURE — 97110 THERAPEUTIC EXERCISES: CPT

## 2019-07-09 PROCEDURE — 92507 TX SP LANG VOICE COMM INDIV: CPT

## 2019-07-09 NOTE — PROGRESS NOTES
PHYSICAL THERAPY - DAILY TREATMENT NOTE    Patient Name: Gualberto Loja        Date: 2019  : 1945   YES Patient  Verified  Visit #:   4     Insurance: Payor: VA MEDICARE / Plan: VA MEDICARE PART A & B / Product Type: Medicare /      In time: 9:30 Out time: 10:30   Total Treatment Time: 60     Medicare/BCBS [place] Time Tracking (below)   Total Timed Codes (min):  60 1:1 Treatment Time:  60     TREATMENT AREA =  Dysarthria [R47.1]  CVA (cerebral vascular accident) ([facility] [address] [I63.9]  SUBJECTIVE    Pain Level (on 0 to 10 scale):  0  / 10   Medication Changes/New allergies or changes in medical history, any new surgeries or procedures? NO    If yes, update Summary List   Subjective Functional Status/Changes:  []  No changes reported     Pt reports compliance with HEP, reports difficulty including LOB with MSR instep EO on pillow at home.         OBJECTIVE    45 min Therapeutic Exercise:  [x]  See flow sheet   Rationale:      increase ROM, increase strength, improve coordination, improve balance and increase proprioception to improve the patients ability to perform ADLs/IADLs, functional mobility and gait safely and independently without increased pain/symptoms     15 min Neuromuscular Re-ed: Tap-ups, EO/EC balance ex, VSE per flowsheet   Rationale: improve balance to improve the patient's ability to perform ADLs/IADLs, functional mobility and gait safely and independently without increased pain/symptoms      During TE/NM min Patient Education:  YES  Reviewed HEP   [x]  Progressed/Changed HEP based on:   Cont HEP; regressed EC balance on pillow to Romberg; added VSE seated     Other Objective/Functional Measures:    Exercises per flowsheet  Increased resistance Nustep  Increased resistance/reps stand hip abd/ext  Increased reps mini-squats  Added stationary lunge in parallel bars with toes of front leg against wall to prevent excessive anterior translation of knee  Increased reps step-ups  Added og  Reviewed balance HEP and regressed EC balance on pillow to Romberg due to pt c/o difficulty with instep  Added VSE seated     Post Treatment Pain Level (on 0 to 10) scale:   0  / 10     ASSESSMENT    Assessment/Changes in Function:     Tolerated exercises without complaints     []  See Progress Note/Recertification   Patient will continue to benefit from skilled PT services to modify and progress therapeutic interventions, address functional mobility deficits, address ROM deficits, address strength deficits, analyze and address soft tissue restrictions, analyze and cue movement patterns, analyze and modify body mechanics/ergonomics, assess and modify postural abnormalities, address imbalance/dizziness and instruct in home and community integration to attain remaining goals. Progress toward goals / Updated goals:    Progressing toward goals: · Short Term Goals: To be accomplished in  2-3  weeks:  1.  Patient will demonstrate ability to perform ROM with eyes closed for 30\" to increase safety with ADL's. - MET  2.  Increase FGA score to 24/30 to decrease fall risk. 3.  Patient will be compliant with home exercise program. - Reports compliance   · Long Term Goals: To be accomplished in  4-6  weeks:  1.  Patient will demonstrate strength of > 4/5 with right hip abduction to increase dynamic stability with gait. - Increased reps/resistance standing exercises  2.  Increase FGA score to 26/30 to decrease fall risk. 3.  Patient will be independent with home exercise program.  4.  Patient will increase FOTO Functional Status score to 84/100 to indicate decreased functional limitations.        PLAN    [x]  Upgrade activities as tolerated YES Continue plan of care   []  Discharge due to :    []  Other:      Therapist: Ning Terrell PT    Date: 7/9/2019 Time: 9:35 AM     Future Appointments   Date Time Provider Kirill Chaudhry   7/16/2019  8:30 AM Miguel Ozzy [facility]   7/16/2019  9:30 AM Sushma Núñez, PT [facility]   7/23/2019  8:30 AM Papi Puentes [facility]   7/23/2019  9:30 AM Sushma Núñez, PT [facility]   7/30/2019  9:30 AM Papi Puentes [facility]   7/30/2019 10:30 AM Danita Benavidez, PT [facility]

## 2019-07-09 NOTE — PROGRESS NOTES
ST DAILY TREATMENT NOTE    Patient Name: Matt Abernathy  Date:2019  : 1945  [x]  Patient  Verified  Payor: Payor: VA MEDICARE / Plan: VA MEDICARE PART A & B / Product Type: Medicare /   In time:830  Out time:915  Total Treatment Time (min): 45  Visit #: 4 of 8-10    Treatment Diagnosis: Dysarthria [R47.1]  CVA (cerebral vascular accident) ([facility] [address] [I63.9]    SUBJECTIVE  Pain Level (0-10 scale): 0  Any medication changes, allergies to medications, adverse drug reactions, diagnosis change, or new procedure performed?: [x] No    [] Yes (see summary sheet for update)    Subjective functional status/changes:   [] No changes reported  Patient reports continued difficulty with speech in the evening, slightly improving    OBJECTIVE  Treatment provided includes:  Increase/Improve:  []  Voice Quality []  Cognitive Linguistic Skills []  Laryngeal/Pharyngeal Exercises   [x]  Vocal Loudness []  Reading Comprehension [x]  Swallowing Skills    []  Vocal Cord Function []  Auditory Comprehension [x]  Oral Motor Skills   []  Resonance []  Writing Skills []  Compensatory strategies    []  Speech Intelligibility []  Expressive Language []  Attention   []  Breath Support/Coord.  []  Receptive language []  Memory   [x]  Articulation []  Safety Awareness []    []  Fluency []  Word Retrieval []        Treatment Provided:  -produced 4 syllable words with 70% accuracy [place], 90% Alba, 100% modA  -produced 4 syllable words in sentences with 70% accuracy [place], 90% Alba, 100% modA  -participated in regular, thin liquid meal with use of safe swallowing guidelines [place], 0 s/sx of aspiration  -patient completed oral motor exercises with min-modA    Patient/Caregiver  Education: [x] Review HEP      HEP/Handouts given: continue current HEP    Pain Level (0-10 scale) post treatment: 0    ASSESSMENT   []   Improving appropriately and progressing toward goals  []   Improving slowly and progressing toward goals  []   Approximating goals/maximum potential  []   Continues to benefit from skilled therapy to address remaining functional deficits  []   Not progressing toward goals and plan of care will be adjusted    Patient will continue to benefit from skilled therapy to address remaining functional deficits: dysarthria, dysphagia    Progress towards goals / Updated goals:  Patient is demonstrating steady progress towards goals within the therapy room. Patient reports his speech continues to worsen at night, however reports he does not utilize all motor speech strategies; educated on importance of use of motor speech strategies whenever speech begins to worsen. Discussed importance of using visual feedback with completion of oral motor exercises at home,benefits from visual and verbal cues in therapy. SLP recommends continue skilled ST services. PLAN  [x]  Continue plan of care  []  Modify Goals/Treatment Plan      []  Discharge due to:  [] Other:    Short-Term Goals:  Pt will:  1. Produce 3+ syllable words with 90% intelligibility using speech intelligibility strategies with min A.  2. Produce sentences using multi-syllabic target words with 80% intelligibility using speech strategies (slow rate, overarticulation, increased volume) with min A  3. Produce passages utilizing speech strategies (slow rate, overarticulation, increased volume) with 80% intelligibility with Alba  4. Utilize compensatory strategies (decrease rate, overarticulate, increase intensity) to increase intelligibility to 90% at conversation level with min visual/verbal cues in 4/5 trials. 5. Perform oral motor exercises (with and without resistance) in therapy and at home to increase oral motor strength/range-of-motion for articulation tasks with min visual/verbal cues in 4/5 trials. 6. Complete diagnostic therapy for complaints of dysphagia within 3 sessions with results/recommendations to follow.  -MET  7. Demonstrate comprehension of internal/external memory enhancing techniques in 4/5 opportunities Alba  8. Demonstrate ability to utilize aspiration/reflux precautions and compensatory strategies (double swallow; diet consistency adjustment; alternate liquids/solids; small sips/bites, meds crushed) to decrease the reported incidences of dysphagia and s/sx of aspiration with min A with visual/verbal cues in 3/3 meal trials.       Dori Skinner [facility] SLP 7/9/2019  9:12 AM    Future Appointments   Date Time Provider Kirill Chaudhry   7/9/2019  9:30 AM Gloria Lemus PT [facility]   7/16/2019  8:30 AM [facility]   7/16/2019  9:30 AM Gloria Lemus PT [facility]   7/23/2019  8:30 AM [facility]   7/23/2019  9:30 AM Gloria Lemus PT [facility]   7/30/2019  9:30 AM [facility]   7/30/2019 10:30 AM Kassandra Hou PT [facility]

## 2019-07-16 ENCOUNTER — APPOINTMENT (OUTPATIENT)
Dept: PHYSICAL THERAPY | Age: 74
End: 2019-07-16
Payer: MEDICARE

## 2019-07-23 ENCOUNTER — HOSPITAL ENCOUNTER (OUTPATIENT)
Dept: PHYSICAL THERAPY | Age: 74
Discharge: HOME | End: 2019-07-23
Payer: MEDICARE

## 2019-07-23 PROCEDURE — 92507 TX SP LANG VOICE COMM INDIV: CPT

## 2019-07-23 PROCEDURE — 97530 THERAPEUTIC ACTIVITIES: CPT

## 2019-07-23 PROCEDURE — 97112 NEUROMUSCULAR REEDUCATION: CPT

## 2019-07-23 PROCEDURE — 92526 ORAL FUNCTION THERAPY: CPT

## 2019-07-23 PROCEDURE — 97110 THERAPEUTIC EXERCISES: CPT

## 2019-07-23 NOTE — PROGRESS NOTES
[address] PHYSICAL THERAPY  [address] [place] [address] Claudetta Dutton [place], [address] - Phone: [phone]  Fax: [phone] [facility]          Patient Name: Alexy Kelly : 1945   Treatment/Medical Diagnosis: Dysarthria [R47.1]  CVA (cerebral vascular accident) [facility]) [I63.9]   Onset Date: 2019    Referral Source: Devaughn Gosselin, MD [facility]): 2019   Prior Hospitalization: See Medical History Provider #: [id number]   Prior Level of Function: Independent, exercising 3x/week at gym   Comorbidities: HTN, hyperlipidemia   Medications: Verified on Patient Summary List   Visits from [facility]: 5 Missed Visits: 1     Goal/Measure of Progress Goal Met? 1. Patient will demonstrate ability to perform ROM with eyes closed for 30\" to increase safety with ADL's. Status at last Eval: Rom EC = 30\" Current Status: Rom EC = 30\" yes   2. Increase FGA score to 24/30 to decrease fall risk. Status at last Eval: FGA =  Current Status: FGA =  no   3. Patient will be compliant with home exercise program.   Status at last Eval: NA  Current Status: Compliant with HEP yes     Key Functional Changes/Progress: Patient has progressed with exercises in clinic, and reports compliance with HEP. Patient cancelled appointment 2019 due to illness. FOTO has increased from 78/100 to 81/100. FGA (Functional Gait Assessment) has decreased from / to  due to decreased gait speed and increased difficulty with tandem gait, possibly secondary to illness last week. 10-meter walk test = 17\" (0.58 m/s), limited community ambulator. Patient ambulates without assistive device, continues to demonstrate decreased foot clearance. FTSST (Five Time Sit to Stand Test) = 18\". Patient able to maintain sharpened Romberg eyes open 30\"B (multiple attempts), single leg stance eyes open < 1\"B. B knee flex/ext and ankle DF/PF = 5/5.   Hip strength remains limited (see below). Hip L (1-5) R (1-5)   Hip Flexion 5 5   Hip Ext 4- 3   Hip ABD 4 4-   Hip ADD 5 5   Hip ER 4   4   Hip IR  4- 4-       Problem List: decrease ROM, decrease strength, impaired gait/ balance, decrease ADL/ functional abilitiies, decrease activity tolerance, decrease flexibility/ joint mobility and decrease transfer abilities   Treatment Plan may include any combination of the following: Therapeutic exercise, Therapeutic activities, Neuromuscular re-education, Physical agent/modality, Gait/balance training, Manual therapy, Patient education, Functional mobility training, Home safety training and Stair training  Patient Goal(s) has been updated and includes:      Goals for this certification period include and are to be achieved in   2-4  weeks:  1. Patient will demonstrate strength of > 4/5 with right hip abduction to increase dynamic stability with gait. 2.  Increase FGA score to 26/30 to decrease fall risk. 3.  Patient will be independent with home exercise program.  4.  Patient will increase FOTO Functional Status score to 84/100 to indicate decreased functional limitations. Frequency / Duration:   Patient to be seen   1-2   times per week for   2-4    weeks:    Assessments/Recommendations: Patient would benefit from continued skilled PT services to address decreased ROM, decreased strength, decreased balance, decreased activity tolerance and impaired functional mobility/gait to improve the patient's ability to perform ADLs/IADLs, functional mobility and gait safely and independently without increased pain/symptoms. Will focus on increased hip strength, improved static/dynamic balance and increased gait speed. If you have any questions/comments please contact us directly at [phone]. Thank you for allowing us to assist in the care of your patient.     Therapist Signature: Greer Hough PT Date: 7/93/1016   Certification Period:  Reporting Period: 6/13/2019-9/12/2019 6/13/2019-7/23/2019 Time: 9:55 AM   NOTE TO PHYSICIAN:  PLEASE COMPLETE THE ORDERS BELOW AND FAX TO   [facility] Physical Therapy: [phone]. If you are unable to process this request in 24 hours please contact our office: [phone].    ___ I have read the above report and request that my patient continue as recommended.   ___ I have read the above report and request that my patient continue therapy with the following changes/special instructions: ________________________________________________   ___ I have read the above report and request that my patient be discharged from therapy.      Physician Signature:        Date:       Time:

## 2019-07-23 NOTE — PROGRESS NOTES
[facility] @ [address]  Ph: [phone]  Fax: [phone]    Continued Plan of Care/ Re-certification for Speech Therapy Services    Patient name: Megan Ibarra Start of Care: 19   Referral source: Jacqueline Brooks MD : 1945   Medical/Treatment Diagnosis: Dysarthria [R47.1]  CVA (cerebral vascular accident) [facility]) [I63.9] Onset Date:2019     Prior Hospitalization: see medical history Provider#: [id number]   Medications: Verified on Patient Summary List    Comorbidities: CVA, Bell's Palsy, high blood pressure  Prior Level of Function:independent, lives with wife, retired Mabel  Visits from [facility]: 5    Missed Visits: [facility] and following information is based on the patient's current status:  Goal: 1. Produce 3+ syllable words with 90% intelligibility using speech intelligibility strategies with min A. Status at last note/certification: Goal created at evaluation  Current Status: met -produced 4 syllable words with 70% accuracy [place], 90% Alba, 100% modA    Goal: 2. Produce sentences using multi-syllabic target words with 80% intelligibility using speech strategies (slow rate, overarticulation, increased volume) with min A  Status at last note/certification:Goal created at evaluation  Current Status: met -produced 4 syllable words in sentences with 70% accuracy [place], 90% Alba, 100% modA    Goal: 3. Produce passages utilizing speech strategies (slow rate, overarticulation, increased volume) with 80% intelligibility with Alba  Status at last note/certification:Goal created at evaluation  Current Status: not met -read passages using motor speech strategies with 60% accuracy modA. Goal: 4. Utilize compensatory strategies (decrease rate, overarticulate, increase intensity) to increase intelligibility to 90% at conversation level with min visual/verbal cues in 4/5 trials.   Status at last note/certification:Goal created at evaluation  Current Status: not met- not yet addressed    Goal:5. Perform oral motor exercises (with and without resistance) in therapy and at home to increase oral motor strength/range-of-motion for articulation tasks with min visual/verbal cues in 4/5 trials. Status at last note/certification:Goal created at evaluation  Current Status: met -completed oral motor exercises with Alba in 4/5 opportunities    Goal: 6. Complete diagnostic therapy for complaints of dysphagia within 3 sessions with results/recommendations to follow. -MET  Status at last note/certification:Goal created at evaluation  Current Status: met    Goal: 7. Demonstrate comprehension of internal/external memory enhancing techniques in 4/5 opportunities Alba  Status at last note/certification:Goal created at evaluation  Current Status: not met -reported use of paying attention, listening for the main idea, and association memory techniques at home; discussed use of pictures and writing information down as external memory techniques    Goal: 8.  Demonstrate ability to utilize aspiration/reflux precautions and compensatory strategies (double swallow; diet consistency adjustment; alternate liquids/solids; small sips/bites, meds crushed) to decrease the reported incidences of dysphagia and s/sx of aspiration with min A with visual/verbal cues in 3/3 meal trials  Status at last note/certification:Goal created at evaluation  Current Status: not met -participated in thin/regular snack with use of safe swallowing guidelines in 4/5 trials, x1 cough noted (2/3 meal trials)      Key functional changes:   Patient is demonstrating steady progress towards goals. The patient is able to recall all motor speech strategies, utilizes them at the word and sentences level. Will continue to advance exercises as appropriate through passages and generalize to spontaneous conversation.  The Patient reports use of some memory enhancing techniques at home, however will continue to address for increased usage of memory enhancing techniques d/t memory complaints. Patient wishes to continues to improve lingual movements and project voice so people don't ask him to repeat, especially at night. Patient reports using safe swallowing guidelines at home with improvement in tolerance of regular, thin liquid diet; will address for 1 more observed meal.    Problems/ barriers to goal attainment: cognitive     Problem List: Dysarthria and Impaired Cognition, Dysphagia    Treatment Plan: Dysarthria Treatment and Othermemory    Patient Goal (s) has been updated and includes: \"To move my tongue better and to project my voice well. \"     Goals for this certification period to be accomplished in 8-10 weeks:  Pt will: 1. Produce passages utilizing speech strategies (slow rate, overarticulation, increased volume) with 80% intelligibility with Alba  2. Utilize compensatory strategies (decrease rate, overarticulate, increase intensity) to increase intelligibility to 90% at conversation level with min visual/verbal cues in 4/5 trials. 3. Demonstrate comprehension of internal/external memory enhancing techniques in 4/5 opportunities Alba  4.  Demonstrate ability to utilize aspiration/reflux precautions and compensatory strategies (double swallow; diet consistency adjustment; alternate liquids/solids; small sips/bites, meds crushed) to decrease the reported incidences of dysphagia and s/sx of aspiration with min A with visual/verbal cues in 3/3 meal trials.     Frequency / Duration: Patient to be seen 1-2 times per week for 4 weeks:    Assessment/Recommendations: Continue skilled ST services to address dysarthria, dysphagia, memory    The severity rating is based on the following outcomes:    National Outcomes Measures (NOMS)    Certification Period: 7/23/19-8/22/19    Pattie Castro [facility] SLP 7/23/2019 9:30 AM    _____________________________________________________________________    I certify that the above Therapy Services are being furnished while the patient is under my care. I agree with the treatment plan and certify that this therapy is necessary. []  I have read the above report and request that my patient continue as recommended. []  I have read the above report and request that my patient continue therapy with the following changes/special instructions:________________________________________  []I have read the above report and request that my patient be discharged from therapy.     Physician's Signature:_________________ Date:___________Time:__________        Please sign and return via fax to [facility] at [facility]  Fax: [phone]

## 2019-07-23 NOTE — PROGRESS NOTES
PHYSICAL THERAPY - DAILY TREATMENT NOTE    Patient Name: Raghav Needs        Date: 2019  : 1945   YES Patient  Verified  Visit #:     Insurance: Payor: Star Leonardo / Plan: [place] MED[place] PART [place] & B / Product Type: Medicare /      In time: 9:30 Out time: 10:33   Total Treatment Time: 63     Medicare/BCBS [place] Time Tracking (below)   Total Timed Codes (min):  63 1:1 Treatment Time:  61     TREATMENT AREA =  Dysarthria [R47.1]  CVA (cerebral vascular accident) ([facility] [address] [I63.9]  SUBJECTIVE    Pain Level (on 0 to 10 scale):  0  / 10   Medication Changes/New allergies or changes in medical history, any new surgeries or procedures? NO    If yes, update Summary List   Subjective Functional Status/Changes:  []  No changes reported     Pt without complaints, reports compliance with HEP.        OBJECTIVE    23 (21) min Therapeutic Exercise:  [x]  See flow sheet   Rationale:      increase ROM, increase strength, improve coordination, improve balance and increase proprioception to improve the patients ability to perform ADLs/IADLs, functional mobility and gait safely and independently without increased pain/symptoms     30 min Therapeutic Activity: FOTO, FGA, FTSST, 10MWT   Rationale: assess ADL/IADL function, functional mobility and gait to  Improve the patient's ability to perform ADLs/IADLs, functional mobility and gait safely and independently without increased pain/symptoms       10 min Neuromuscular Re-ed: VSE, SR, SLS   Rationale: assess and improve balance to improve the patient's ability to perform ADLs/IADLs, functional mobility and gait safely and independently without increased pain/symptoms      During TE/NM min Patient Education:  YES  Reviewed HEP   []  Progressed/Changed HEP based on:   Updated HEP (copy in chart)     Other Objective/Functional Measures:    FOTO = 81/100    FTSST = 18\"    FGA = /30  10 MWT = 17\" (0.58 m/s)  Continues to demonstrate decreased foot clearance    Strength (MMT):                                          Hip L (1-5) R (1-5)   Hip Flexion 5 5   Hip Ext 4- 3   Hip ABD 4 4-   Hip ADD 5 5   Hip ER 4   4   Hip IR  4- 4-       Knee L (1-5) R (1-5)   Knee Flexion 5 5   Knee Extension 5 5   Ankle PF 5 5   Ankle DF 5 5   Other          SR = 30\"B (multiple attempts)  SLS = <1\"B    Increased reps stand hip 3-way  Performed step-ups without UE assist  Progressed VSE to standing     Post Treatment Pain Level (on 0 to 10) scale:   0  / 10     ASSESSMENT    Assessment/Changes in Function:     See continued plan of care     []  See Progress Note/Recertification   Patient will continue to benefit from skilled PT services: see continued plan of care   Progress toward goals / Updated goals:    See continued plan of care     PLAN    []  Upgrade activities as tolerated YES Continue plan of care   []  Discharge due to :    []  Other:      Therapist: Danae Evangelista PT    Date: 7/23/2019 Time: 9:35 AM     Future Appointments   Date Time Provider Kirill Chaudhry   7/30/2019  9:30 AM Kit ZHENG [facility]   7/30/2019 10:30 AM Sav Gallegos PT [facility]

## 2019-07-23 NOTE — PROGRESS NOTES
ST DAILY TREATMENT NOTE    Patient Name: Karyn Cleveland  Date:2019  : 1945  [x]  Patient  Verified  Payor: Payor: Lisa Rios / Plan: [place] MEDICARE PART A & B / Product Type: Medicare /   In PDBW:[id number]  Out time:0930  Total Treatment Time (min): 54  Visit #: 5 of 8-10    Treatment Diagnosis: Dysarthria [R47.1]  CVA (cerebral vascular accident) ([facility] [address] [I63.9]    SUBJECTIVE  Pain Level (0-10 scale): 0  Any medication changes, allergies to medications, adverse drug reactions, diagnosis change, or new procedure performed?: [] No    [x] Yes (see summary sheet for update)    Subjective functional status/changes:   [] No changes reported  Patient reports missing last week due to sickness. OBJECTIVE  Treatment provided includes:  Increase/Improve:  []  Voice Quality []  Cognitive Linguistic Skills []  Laryngeal/Pharyngeal Exercises   []  Vocal Loudness []  Reading Comprehension [x]  Swallowing Skills    []  Vocal Cord Function []  Auditory Comprehension []  Oral Motor Skills   []  Resonance []  Writing Skills []  Compensatory strategies    []  Speech Intelligibility []  Expressive Language []  Attention   []  Breath Support/Coord. []  Receptive language [x]  Memory   []  Articulation []  Safety Awareness []    []  Fluency []  Word Retrieval []        Treatment Provided:  -reported use of paying attention, listening for the main idea, and association memory techniques at home; discussed use of pictures and writing information down as external memory techniques  -participated in thin/regular snack with use of safe swallowing guidelines in 4/5 trials, x1 cough noted.   -read passages using motor speech strategies with 60% accuracy modA.  -completed oral motor exercises with Alba in 4/5 opportunities    Patient/Caregiver  Education: [x] Review HEP      HEP/Handouts given: read passages aloud using motor speech strategies; utilize memory strategies, oral motor exercises 2-3x/day    Pain Level (0-10 scale) post treatment: 0    ASSESSMENT   [x]   Improving appropriately and progressing toward goals  []   Improving slowly and progressing toward goals  []   Approximating goals/maximum potential  [x]   Continues to benefit from skilled therapy to address remaining functional deficits  []   Not progressing toward goals and plan of care will be adjusted    Patient will continue to benefit from skilled therapy to address remaining functional deficits: dysarthria, memory    Progress towards goals / Updated goals:  Patient is demonstrating steady progress towards goals. Patient benefits from auditory feedback during passage reading tasks. Discussed need to read to wife and utilize auditory feedback at home. Oral motor exercises patient was able to complete with minimal verbal cues, had increased verbal cues with labial spread exercises. Patient reported updated goal will be able to fine tune lingual movement and project voice so that wife no longer asks him to repeat himself. PLAN  []  Continue plan of care  []  Modify Goals/Treatment Plan      []  Discharge due to:  [x] Other: Update goals per recertification. Short-Term Goals:  Pt will:  1. Produce 3+ syllable words with 90% intelligibility using speech intelligibility strategies with min A.  2. Produce sentences using multi-syllabic target words with 80% intelligibility using speech strategies (slow rate, overarticulation, increased volume) with min A  3. Produce passages utilizing speech strategies (slow rate, overarticulation, increased volume) with 80% intelligibility with Alba  4. Utilize compensatory strategies (decrease rate, overarticulate, increase intensity) to increase intelligibility to 90% at conversation level with min visual/verbal cues in 4/5 trials. 5. Perform oral motor exercises (with and without resistance) in therapy and at home to increase oral motor strength/range-of-motion for articulation tasks with min visual/verbal cues in 4/5 trials.   6. Complete diagnostic therapy for complaints of dysphagia within 3 sessions with results/recommendations to follow. -MET  7. Demonstrate comprehension of internal/external memory enhancing techniques in 4/5 opportunities Alba  8.  Demonstrate ability to utilize aspiration/reflux precautions and compensatory strategies (double swallow; diet consistency adjustment; alternate liquids/solids; small sips/bites, meds crushed) to decrease the reported incidences of dysphagia and s/sx of aspiration with min A with visual/verbal cues in 3/3 meal trials.         [id number] [facility] SLP 7/23/2019  8:35 AM    Future Appointments   Date Time Provider Kirill Chaudhry   7/23/2019  9:30 AM Meng Yañez PT [facility]   7/30/2019  9:30 AM Vanda ZHENG [facility]   7/30/2019 10:30 AM Renetta Renee PT [facility]

## 2019-07-30 ENCOUNTER — HOSPITAL ENCOUNTER (OUTPATIENT)
Dept: PHYSICAL THERAPY | Age: 74
Discharge: HOME | End: 2019-07-30
Payer: MEDICARE

## 2019-07-30 PROCEDURE — 92526 ORAL FUNCTION THERAPY: CPT

## 2019-07-30 PROCEDURE — 97110 THERAPEUTIC EXERCISES: CPT

## 2019-07-30 PROCEDURE — 92507 TX SP LANG VOICE COMM INDIV: CPT

## 2019-07-30 PROCEDURE — 97112 NEUROMUSCULAR REEDUCATION: CPT

## 2019-07-30 NOTE — PROGRESS NOTES
PHYSICAL THERAPY - DAILY TREATMENT NOTE    Patient Name: Stephany Moreno        Date: 2019  : 1945   YES Patient  Verified  Visit #:     Insurance: Payor: Shabbir Cluster / Plan: [place] MEDICARE PART A & B / Product Type: Medicare /      In time: 10:32 Out time: 11:28   Total Treatment Time: 56     Medicare/BCBS [place] Time Tracking (below)   Total Timed Codes (min):  56 1:1 Treatment Time:  56     TREATMENT AREA =  Dysarthria [R47.1]  CVA (cerebral vascular accident) ([facility] [address] [I63.9]    SUBJECTIVE    Pain Level (on 0 to 10 scale):  0  / 10   Medication Changes/New allergies or changes in medical history, any new surgeries or procedures?    no    If yes, update Summary List   Subjective Functional Status/Changes:  []  No changes reported     Patient with no new c/o's. Reports difficulty with balance exercises. States he is able to maintain most balance exercise for \"about 10 seconds. \"         OBJECTIVE    40 min Therapeutic Exercise:  [x]  See flow sheet   Rationale:      increase ROM, increase strength, improve coordination, improve balance and increase proprioception to improve the patients ability to perform ADL's, gait, and functional mobility with increased safety and independence. 16 min Neuromuscular Re-ed: EO/EC balance exercises per flowsheet   Rationale:      increase strength, improve coordination, improve balance and increase proprioception to improve the patients ability to perform ADL's, gait, and functional mobility with increased safety and independence. min Patient Education:  YES  Reviewed HEP   []  Progressed/Changed HEP based on:   Issued EO/EC balance exercises per handout. Regressed balance exercises to improve benefit of exercises     Other Objective/Functional Measures: Added treadmill. Progressed resistance with minisquats. Added reverse lunge.   Added SLR X 3 on mat   Post Treatment Pain Level (on 0 to 10) scale:   0  / 10 ASSESSMENT    Assessment/Changes in Function:     Verbal cues for increased stride length on right LE when on Treadmill. Verbal cues for correct technique with minisquats and reverse lunges. Required verbal cues for correct technique with sidelying hip abd and prone hip ext. Required tactile cues for correct form with sidelying hip abd.     []  See Progress Note/Recertification   Patient will continue to benefit from skilled PT services to modify and progress therapeutic interventions, address functional mobility deficits, address ROM deficits, address strength deficits, analyze and address soft tissue restrictions, analyze and cue movement patterns, analyze and modify body mechanics/ergonomics, assess and modify postural abnormalities, address imbalance/dizziness and instruct in home and community integration to attain remaining goals. Progress toward goals / Updated goals:    · Goals for this certification period include and are to be achieved in   2-4  weeks:  1.  Patient will demonstrate strength of > 4/5 with right hip abduction to increase dynamic stability with gait. Cont LE strengthening exercises. 2.  Increase FGA score to 26/30 to decrease fall risk. Cont balance exercises   3.  Patient will be independent with home exercise program. Reports compliance with HEP  4.  Patient will increase FOTO Functional Status score to 84/100 to indicate decreased functional limitations.        PLAN    [x]  Upgrade activities as tolerated YES Continue plan of care   []  Discharge due to :    []  Other:      Therapist: Ning Barrios PT    Date: 7/30/2019 Time: 10:38 AM     Future Appointments   Date Time Provider Kirill Chaudhry   8/6/2019 10:00 AM Reji Louie PT [facility]   8/6/2019 11:00 AM [facility]   8/13/2019  8:00 AM [facility]   8/13/2019  9:00 AM Reji Louie PT [facility]   8/20/2019 10:00 AM Reji Louie PT [facility]   8/27/2019 10:00 AM Purvi Vogel PT [facility]   8/27/2019 11:00 AM Sandip ZHENG [facility]

## 2019-07-30 NOTE — PROGRESS NOTES
ST DAILY TREATMENT NOTE    Patient Name: Cheyanne Sears  Date:2019  : 1945  [x]  Patient  Verified  Payor: Payor: Almas Oh / Plan: [place] MEDICARE PART A & B / Product Type: Medicare /   In time:930  Out time:  Total Treatment Time (min): 45  Visit #: 1 of 8-10    Treatment Diagnosis: Dysarthria [R47.1]  CVA (cerebral vascular accident) ([facility] [address] [I63.9]    SUBJECTIVE  Pain Level (0-10 scale): 0  Any medication changes, allergies to medications, adverse drug reactions, diagnosis change, or new procedure performed?: [x] No    [] Yes (see summary sheet for update)    Subjective functional status/changes:   [] No changes reported  Patient reports his son downloaded a voice recording ledy for HEP    OBJECTIVE  Treatment provided includes:  Increase/Improve:  []  Voice Quality []  Cognitive Linguistic Skills []  Laryngeal/Pharyngeal Exercises   [x]  Vocal Loudness []  Reading Comprehension [x]  Swallowing Skills    []  Vocal Cord Function []  Auditory Comprehension []  Oral Motor Skills   []  Resonance []  Writing Skills []  Compensatory strategies    []  Speech Intelligibility []  Expressive Language []  Attention   [x]  Breath Support/Coord.  []  Receptive language []  Memory   [x]  Articulation []  Safety Awareness []    []  Fluency []  Word Retrieval []        Treatment Provided:  -meal trial with use of safe swallowing guidelines and 0 s/sx of aspiration in 100% of opportunities (3/3 meal)  -phonation exercises for increased vocal intensity  -read passages with use of motor speech strategies with 65% min-modA, 90% modA    Patient/Caregiver  Education: [x] Review HEP      HEP/Handouts given: continue to utilize safe swallowing guidelines; SOS in passages, phonation exercises    Pain Level (0-10 scale) post treatment: 0    ASSESSMENT   [x]   Improving appropriately and progressing toward goals  []   Improving slowly and progressing toward goals  []   Approximating goals/maximum potential  [x]   Continues to benefit from skilled therapy to address remaining functional deficits  []   Not progressing toward goals and plan of care will be adjusted    Patient will continue to benefit from skilled therapy to address remaining functional deficits: dysarthria    Progress towards goals / Updated goals:  Patient is demonstrating slow, but steady progress towards goals. Patient continues to demonstrate decrease in intensity at the passage level, with vocal intensity decrease at the end of the passage. Patient benefits from visual and verbal cues. Discussed the functional need to increase intensity in certain situations, such as louder social events, louder home communication events (Ie tv on in the background, calling across the house); verbalized comprehension. PLAN  [x]  Continue plan of care  []  Modify Goals/Treatment Plan      []  Discharge due to:  [] Other:    Short-Term Goals:  Pt will: 1. Produce passages utilizing speech strategies (slow rate, overarticulation, increased volume) with 80% intelligibility with Alba  2. Utilize compensatory strategies (decrease rate, overarticulate, increase intensity) to increase intelligibility to 90% at conversation level with min visual/verbal cues in 4/5 trials. 3. Demonstrate comprehension of internal/external memory enhancing techniques in 4/5 opportunities Alba  4.  Demonstrate ability to utilize aspiration/reflux precautions and compensatory strategies (double swallow; diet consistency adjustment; alternate liquids/solids; small sips/bites, meds crushed) to decrease the reported incidences of dysphagia and s/sx of aspiration with min A with visual/verbal cues in 3/3 meal trials.       [id number] [facility] SLP 7/30/2019  9:47 AM    Future Appointments   Date Time Provider Kirill Chaudhry   7/30/2019 10:30 AM Carrie Walker PT [facility]   8/6/2019 10:00 AM Carrie Walker PT [facility]   8/6/2019 11:00 AM Homar [address] [place] [address]   8/13/2019  8:00 AM Guzman [facility]   8/13/2019  9:00 AM Cinthia Camp PT [facility]   8/20/2019 10:00 AM Cinthia Camp PT [facility]   8/20/2019 11:00 AM Cheraline [facility]   8/27/2019 10:00 AM Cinthia Camp PT [facility]   8/27/2019 11:00 AM Ruba ZHENG [facility]

## 2019-08-06 ENCOUNTER — HOSPITAL ENCOUNTER (OUTPATIENT)
Dept: PHYSICAL THERAPY | Age: 74
Discharge: HOME | End: 2019-08-06
Payer: MEDICARE

## 2019-08-06 PROCEDURE — 97112 NEUROMUSCULAR REEDUCATION: CPT

## 2019-08-06 PROCEDURE — 92507 TX SP LANG VOICE COMM INDIV: CPT

## 2019-08-06 PROCEDURE — 97110 THERAPEUTIC EXERCISES: CPT

## 2019-08-06 NOTE — PROGRESS NOTES
ST DAILY TREATMENT NOTE    Patient Name: Cheyanne Sears  Date:2019  : 1945  [x]  Patient  Verified  Payor: Payor: [address] / Plan: [place] & B / Product Type: Medicare /   In time:1102  Out time:1132  Total Treatment Time (min): 30  Visit #: 2 of 8-10    Treatment Diagnosis: Dysarthria [R47.1]  CVA (cerebral vascular accident) ([facility] [address] [I63.9]    SUBJECTIVE  Pain Level (0-10 scale): 0  Any medication changes, allergies to medications, adverse drug reactions, diagnosis change, or new procedure performed?: [x] No    [] Yes (see summary sheet for update)    Subjective functional status/changes:   [] No changes reported  Patient reported he felt like he projected his voice well at the cookout this past weekend. OBJECTIVE  Treatment provided includes:  Increase/Improve:  []  Voice Quality []  Cognitive Linguistic Skills []  Laryngeal/Pharyngeal Exercises   [x]  Vocal Loudness []  Reading Comprehension []  Swallowing Skills    []  Vocal Cord Function []  Auditory Comprehension []  Oral Motor Skills   []  Resonance []  Writing Skills []  Compensatory strategies    [x]  Speech Intelligibility []  Expressive Language []  Attention   []  Breath Support/Coord.  []  Receptive language []  Memory   []  Articulation []  Safety Awareness []    []  Fluency []  Word Retrieval []        Treatment Provided:  -patient read passages with use of motor speech strategies with 80% accuracy Iliana  -patient participated in conversation with use of motor speech strategies in ~80% of opportunities Iliana    Patient/Caregiver  Education: [x] Review HEP      HEP/Handouts given: download Sound Level Meter ledy, utilize ledy to assist in conversation 1 time a day    Pain Level (0-10 scale) post treatment: 0    ASSESSMENT   []   Improving appropriately and progressing toward goals  [x]   Improving slowly and progressing toward goals  []   Approximating goals/maximum potential  [x]   Continues to benefit from skilled therapy to address remaining functional deficits  []   Not progressing toward goals and plan of care will be adjusted    Patient will continue to benefit from skilled therapy to address remaining functional deficits: dysarthria    Progress towards goals / Updated goals:  Patient is demonstrating slow, but steady progress towards goals. The patient demonstrated improvements in vocal quality with use of sound level meter to self monitor loudness level. The patient utilized improved intensity in conversation about passionate job. Recommend utilize the application to monitor loudness in conversation with partner daily to continue to generalize increased intensity over the course of the conversation. PLAN  [x]  Continue plan of care  []  Modify Goals/Treatment Plan      []  Discharge due to:  [] Other:    Short-Term Goals:  Pt will: 1. Produce passages utilizing speech strategies (slow rate, overarticulation, increased volume) with 80% intelligibility with Alba  2. Utilize compensatory strategies (decrease rate, overarticulate, increase intensity) to increase intelligibility to 90% at conversation level with min visual/verbal cues in 4/5 trials. 3. Demonstrate comprehension of internal/external memory enhancing techniques in 4/5 opportunities Alba  4.  Demonstrate ability to utilize aspiration/reflux precautions and compensatory strategies (double swallow; diet consistency adjustment; alternate liquids/solids; small sips/bites, meds crushed) to decrease the reported incidences of dysphagia and s/sx of aspiration with min A with visual/verbal cues in 3/3 meal trials.       [id number] [facility] SLP 8/6/2019  11:56 AM    Future Appointments   Date Time Provider Kirill Chaudhry   8/13/2019  8:00 AM Griselda Mantle A [facility]   8/13/2019  9:00 AM Aristides Butterfield PT [facility]   8/20/2019 10:00 AM Aristides Butterfield PT [facility]   8/20/2019 11:00 AM Chey Graf [facility]   8/27/2019 10:00 AM Farzana Nuñez, [facility]   8/27/2019 11:00 AM Viktoria ZHENG [facility]

## 2019-08-06 NOTE — PROGRESS NOTES
PHYSICAL THERAPY - DAILY TREATMENT NOTE    Patient Name: Aydee Wilson        Date: 2019  : 1945   YES Patient  Verified  Visit #:     Insurance: Payor: Efren Deng / Plan: [place] MEDICARE PART A & B / Product Type: Medicare /      In time: 10:02 Out time: 11:00   Total Treatment Time: 62     Medicare/BCBS [place] Time Tracking (below)   Total Timed Codes (min):  58 1:1 Treatment Time:  58     TREATMENT AREA =  Dysarthria [R47.1]  CVA (cerebral vascular accident) ([facility] [address] [I63.9]  SUBJECTIVE    Pain Level (on 0 to 10 scale):  0  / 10   Medication Changes/New allergies or changes in medical history, any new surgeries or procedures?    no    If yes, update Summary List   Subjective Functional Status/Changes:  []  No changes reported     \"The balance exercises are still kind of hard but I think I am ready for a progression. \"          OBJECTIVE    34 min Therapeutic Exercise:  [x]  See flow sheet   Rationale:      increase ROM, increase strength, improve coordination, improve balance and increase proprioception to improve the patients ability to perform ADL's, gait, and functional mobility with increased safety and independence. 24 min Neuromuscular Re-ed: EO/EC balance exercises   Rationale:      improve coordination, improve balance and increase proprioception to improve the patients ability to perform ADL's, gait, and functional mobility with increased safety and independence. min Patient Education:  YES  Reviewed HEP   []  Progressed/Changed HEP based on:         Other Objective/Functional Measures:    Dynamic gait as follows (performed without assistive device and with emphasis of maintaining correct stride length on right LE): 1) gait with dual task of naming family members and birth dates X 120', 2) gait with head turns X 100' each (vertical and horizontal), 3) gait while passing ball between hands X 120', 4) gait in grass X 40', 5) up/down ramp 6) up/down curb,, 6) gait on uneven sidewalks X 300', 7) up 3 steps without UE support. EO SR: 30\" B  EC MSR(bun): 30\" B with sway    EO on AirEx MSR(bun): 30\" B (+) sway  EC on AirEx ROM: 30\" (+) sway     Post Treatment Pain Level (on 0 to 10) scale:   0  / 10     ASSESSMENT    Assessment/Changes in Function:     Verbal cues for increased right LE stride length on treadmill. Verbal cues for correct technique with foot placement with EO MSR(GT) and with VSE standing. Patient with difficulty maintaining stride length on right LE when walking on treadmill and talking. Patient with mild difficulty following commands today. []  See Progress Note/Recertification   Patient will continue to benefit from skilled PT services to modify and progress therapeutic interventions, address functional mobility deficits, address ROM deficits, address strength deficits, analyze and address soft tissue restrictions, analyze and cue movement patterns, analyze and modify body mechanics/ergonomics, assess and modify postural abnormalities, address imbalance/dizziness and instruct in home and community integration to attain remaining goals. Progress toward goals / Updated goals:    · Goals for this certification period include and are to be achieved in   2-4  weeks:  1.  Patient will demonstrate strength of > 4/5 with right hip abduction to increase dynamic stability with gait. Cont standing hip abd.  2.  Increase FGA score to 26/30 to decrease fall risk. Cont current balance exercises   3.  Patient will be independent with home exercise program. Reports compliance with HEP  4.  Patient will increase FOTO Functional Status score to 84/100 to indicate decreased functional limitations.      PLAN    [x]  Upgrade activities as tolerated YES Continue plan of care   []  Discharge due to :    []  Other:      Therapist: Bernardino Diop PT    Date: 8/6/2019 Time: 10:06 AM     Future Appointments   Date Time Provider Kirill Chaudhry   8/6/2019 11:00 AM Homar [facility]   8/13/2019  8:00 [facility]   8/13/2019  9:00 AM Lindsay Samayoa PT [facility]   8/20/2019 10:00 AM Lindsay Samayoa [facility]   [facility]/2019 [facility]   8/27/2019 10:00 AM Lindsay Samayoa PT [facility]   8/27/2019 11:00 AM Temitope Orozco [facility]

## 2019-08-13 ENCOUNTER — HOSPITAL ENCOUNTER (OUTPATIENT)
Dept: PHYSICAL THERAPY | Age: 74
Discharge: HOME | End: 2019-08-13
Payer: MEDICARE

## 2019-08-13 PROCEDURE — 97110 THERAPEUTIC EXERCISES: CPT

## 2019-08-13 PROCEDURE — 97112 NEUROMUSCULAR REEDUCATION: CPT

## 2019-08-13 PROCEDURE — 92507 TX SP LANG VOICE COMM INDIV: CPT

## 2019-08-13 NOTE — PROGRESS NOTES
[facility] - [address]  Ph: [phone]  Fax: [phone]    Speech Therapy Daily Note Discharge Summary  Date:2019  Patient name: Libby Escamilla Start of Care: 19   Referral source: Sanya Seals MD : 1945   Medical/Treatment Diagnosis: Dysarthria [R47.1]  CVA (cerebral vascular accident) [facility]) [I63.9] Onset Date:2019     Prior Hospitalization: see medical history Provider#: [id number]   Medications: Verified on Patient Summary List    Comorbidities: CVA, hx of [place] Palsy, high blood pressure  Prior Level of Function:independent, lives with wife, retired    Visits from [facility]: 8    Missed Visits: 1    Reporting Period : 19 to 19    Patient Name: Libby Escamilla  Date:2019  : 1945  [x]  Patient  Verified  Payor: Payor: VA MEDICARE / Plan: VA MEDICARE PART A & B / Product Type: Medicare /   In time:0800  Out WRIW:[id number]  Total Treatment Time (min): 39  Visit #: 3 of 8-10    Treatment Diagnosis: Dysarthria [R47.1]  CVA (cerebral vascular accident) ([facility] [address] [I63.9]    SUBJECTIVE  Pain Level (0-10 scale): 0  Any medication changes, allergies to medications, adverse drug reactions, diagnosis change, or new procedure performed?: [x] No    [] Yes (see summary sheet for update)    Subjective functional status/changes:   [] No changes reported  Patient downloaded sound level meter application, reported it helps significantly. OBJECTIVE  Treatment provided includes:  Increase/Improve:  []  Voice Quality []  Cognitive Linguistic Skills []  Laryngeal/Pharyngeal Exercises   [x]  Vocal Loudness []  Reading Comprehension []  Swallowing Skills    []  Vocal Cord Function []  Auditory Comprehension []  Oral Motor Skills   []  Resonance []  Writing Skills []  Compensatory strategies    []  Speech Intelligibility []  Expressive Language []  Attention   []  Breath Support/Coord.  []  Receptive language []  Memory   [x]  Articulation []  Safety Awareness []    []  Fluency []  Word Retrieval []        Treatment Provided:  -conversation >90% intelligibility  -conversation 70-78dBSPL Iliana  -reported use of internal/external memory strategies to assist with names, dates, important information    Patient/Caregiver  Education: [x] Review HEP      HEP/Handouts given: continue 1 conversation a day using sound level meter application    Pain Level (0-10 scale) post treatment: 0    Summary of Care:  Goal: 1. Produce passages utilizing speech strategies (slow rate, overarticulation, increased volume) with 80% intelligibility with Alba  Status at last note/certification: -read passages using motor speech strategies with 60% accuracy modA. Status at discharge: met -patient read passages with use of motor speech strategies with 80% accuracy Iliana    Goal: 2. Utilize compensatory strategies (decrease rate, overarticulate, increase intensity) to increase intelligibility to 90% at conversation level with min visual/verbal cues in 4/5 trials.   Status at last note/certification: not yet addressed  Status at discharge: met -conversation >90% intelligibility  -conversation 70-78dBSPL Iliana    Goal: 3. Demonstrate comprehension of internal/external memory enhancing techniques in 4/5 opportunities Alba  Status at last note/certification:  -reported use of paying attention, listening for the main idea, and association memory techniques at home; discussed use of pictures and writing information down as external memory techniques  Status at discharge: met -reported use of internal/external memory strategies to assist with names, dates, important information    Goal: 4.  Demonstrate ability to utilize aspiration/reflux precautions and compensatory strategies (double swallow; diet consistency adjustment; alternate liquids/solids; small sips/bites, meds crushed) to decrease the reported incidences of dysphagia and s/sx of aspiration with min A with visual/verbal cues in 3/3 meal trials. Status at last note/certification: -participated in thin/regular snack with use of safe swallowing guidelines in 4/5 trials, x1 cough noted (2/3 meal trials)  Status at discharge: met -meal trial with use of safe swallowing guidelines and 0 s/sx of aspiration in 100% of opportunities (3/3 meal)      ASSESSMENT: Patient has met all goals and will be discharged from therapy. The patient has demonstrated steady progress with use of memory strategies, safe swallowing guidelines, and motor speech strategies to improve communication effectiveness. The patient reports he utilizes motor speech strategies in social events and continue to work on using strategies at home with personal conversations with his wife in the evening. The patient reports his wife does not ask him to repeat himself nearly as much and he is projecting his voice more. Within the therapy room, the patient in >90% intelligible in conversation and utilizes strategies. Further, the patient has downloaded application to measure intensity for him to self monitor at home. The patient has met all goals and will be discharged from therapy.     The severity rating is based on the following outcomes:    National Outcomes Measures (NOMS)    RECOMMENDATIONS:  [x]Discontinue therapy: [x]Patient has reached or is progressing toward set goals      []Patient is non-compliant or has abdicated      []Due to lack of appreciable progress towards set goals    [id number] [facility] SLP 8/13/2019 8:55 AM

## 2019-08-13 NOTE — PROGRESS NOTES
PHYSICAL THERAPY - DAILY TREATMENT NOTE    Patient Name: Gabe Humphreys        Date: 2019  : 1945   YES Patient  Verified  Visit #:     Insurance: Payor: Rosalbariyarosalind Mean / Plan: VA MEDICARE PART A & B / Product Type: Medicare /      In time: 9:05 Out time: 10:03   Total Treatment Time: 58     Medicare/BCBS [place] Time Tracking (below)   Total Timed Codes (min):  58 1:1 Treatment Time:  58     TREATMENT AREA =  CVA (cerebral vascular accident) ([facility] [address] [I63.9]    SUBJECTIVE    Pain Level (on 0 to 10 scale):  0  / 10   Medication Changes/New allergies or changes in medical history, any new surgeries or procedures?    no    If yes, update Summary List   Subjective Functional Status/Changes:  []  No changes reported     Reports he goes to the gym Monday, Wednesday, and Friday. \"I increased the speed on the treadmill to 2.0 mph yesterday. OBJECTIVE    30 min Therapeutic Exercise:  [x]  See flow sheet   Rationale:      increase ROM, increase strength, improve coordination, improve balance and increase proprioception to improve the patients ability to perform ADL's, gait,and functional mobility with increased safety and independence. 28 min Neuromuscular Re-ed: EO/EC balance exercises   Rationale:      improve coordination, improve balance and increase proprioception to improve the patients ability to perform ADL's, gait,and functional mobility with increased safety and independence.       min Patient Education:  YES  Reviewed HEP   []  Progressed/Changed HEP based on:   Finalized HEP     Other Objective/Functional Measures:                                          Strength (1-5)  Hip Left Right   Flexion 5 5   Extension 4- 3   Abduction 4+ 4   Adduction 5 5   ER 5 4+   IR 5 4+   Knee Left Right   Extension 5 5   Flexion 5 4     Five Times Sit to Stand = 13\"  EO SLS right: 5\"/left: 2\"  FGA = /30   Post Treatment Pain Level (on 0 to 10) scale:   0  / 10 ASSESSMENT    Assessment/Changes in Function:     Noted improved stride length with right LE when walking into the clinic today and when on treadmill. Patient has reached a plateau with static standing balance exercises. []  See Progress Note/Recertification   Patient will continue to benefit from skilled PT services to modify and progress therapeutic interventions, address functional mobility deficits, address ROM deficits, address strength deficits, analyze and address soft tissue restrictions, analyze and cue movement patterns, analyze and modify body mechanics/ergonomics, assess and modify postural abnormalities, address imbalance/dizziness and instruct in home and community integration to attain remaining goals.      Progress toward goals / Updated goals:    See DC note to MD     PLAN    []  Upgrade activities as tolerated YES Continue plan of care   [x]  Discharge due to : Goals met   []  Other:      Therapist: Dmitri Zelaya PT    Date: 8/13/2019 Time: 7:31 AM     Future Appointments   Date Time Provider Kirill Chaudhry   8/13/2019  8:00 AM Vianey [facility]   8/13/2019  9:00 AM Estela Way PT [facility]   8/20/2019 10:00 AM Estela Way PT [facility]   8/20/2019 11:00 AM Isaac Graf [facility]   8/27/2019 10:00 AM Estela [address] PT [facility]   8/27/2019 11:00 [facility]

## 2019-08-13 NOTE — PROGRESS NOTES
[address] PHYSICAL THERAPY  [address] [place] [address] Tiarra Finley, [address] - Phone: [phone]  Fax: [phone]  DISCHARGE SUMMARY FOR PHYSICAL THERAPY          Patient Name: Ej Gaspar : 1945   Treatment/Medical Diagnosis: Dysarthria [R47.1]  CVA (cerebral vascular accident) [facility]) [I63.9]   Onset Date: 2019    Referral Source: Socorro Mccoy MD [facility]): 2019   Prior Hospitalization: See Medical History Provider #: [id number]   Prior Level of Function: Independent, exercising 3x/week at gym   Comorbidities: HTN, hyperlipidemia   Medications: Verified on Patient Summary List   Visits from [facility]: 8 Missed Visits: 1     Goal/Measure of Progress Goal Met? 1. Patient will demonstrate strength of > 4/5 with right hip abduction to increase dynamic stability with gait. Status at last Eval: 4-/5 Current Status: 4/5 yes   2. Increase FGA score to /30 to decrease fall risk. Status at last Eval:  Current Status: 27/ yes   3. Patient will be independent with home exercise program.   Status at last Eval: Compliant with HEP Current Status: independent with HEP yes   4. Patient will increase FOTO Functional Status score to 84/100 to indicate decreased functional limitations. Status at last Eval: 81/100 Current Status: 81/100 no     Key Functional Changes/Progress: Patient has progressed well with physical therapy. His Functional Gait Assessment (FGA) score increased from  to , indicating decreased fall risk. Five Times Sit to Stand test = 13\" today (initially 18\") indicating decreased fall risk. His gait velocity is now 0.91 m/sec (10 Meter Walk Test = 11\"), indicating he is a community ambulator. FOTO (Focused on Therapeutic Outcomes) functional status score remains unchanged since eval, indicating no change in tolerance with functional activities.  Patient has recently demonstrated improved foot clearance and normal stride length on the right LE. He still has difficulty with single leg stance: left 2\"/right 5\". He has reached a plateau with static standing balance exercises and has been given a hoe exercise program. He has returned to his normal gym routine for strengthening. B LE strength has improved but he still has deficits as listed below. Strength (1-5)  Hip Left Right   Flexion 5 5   Extension 4- 3   Abduction 4+ 4   Adduction 5 5   ER 5 4+   IR 5 4+   Knee Left Right   Extension 5 5   Flexion 5 4       Assessments/Recommendations: Discontinue therapy. Progressing towards or have reached established goals. If you have any questions/comments please contact us directly at [phone]. Thank you for allowing us to assist in the care of your patient. Therapist Signature: Flor Beltran PT Date: 0/18/6188   Certification Period  Reporting Period: 6/13/2019-9/12/2019 7/23/2019 - 8/13/2019 Time: 10:07 AM     NOTE TO PHYSICIAN:  PLEASE COMPLETE THE ORDERS BELOW AND FAX TO   [facility]: [phone]. If you are unable to process this request in 24 hours please contact our office: [phone].    ___ I have read the above report and request that my patient be discharged from therapy.      Physician Signature:        Date:       Time:

## 2019-08-20 ENCOUNTER — APPOINTMENT (OUTPATIENT)
Dept: PHYSICAL THERAPY | Age: 74
End: 2019-08-20
Payer: MEDICARE

## 2019-08-27 ENCOUNTER — APPOINTMENT (OUTPATIENT)
Dept: PHYSICAL THERAPY | Age: 74
End: 2019-08-27
Payer: MEDICARE

## 2020-05-14 ENCOUNTER — HOSPITAL ENCOUNTER (OUTPATIENT)
Dept: PHYSICAL THERAPY | Age: 75
Discharge: HOME | End: 2020-05-14
Payer: MEDICARE

## 2020-05-14 PROCEDURE — 92526 ORAL FUNCTION THERAPY: CPT

## 2020-05-14 PROCEDURE — 92610 EVALUATE SWALLOWING FUNCTION: CPT

## 2020-05-14 NOTE — PROGRESS NOTES
[facility] -[place] [address]  Ph: [phone]   Fax: [phone] of Care/ Statement of Necessity for Speech Therapy Services    Patient name: Praveena Holman Start of Care: 2020   Referral source: Britton Finley MD : 1945    Medical Diagnosis: Dysarthria [R47.1]  CVA (cerebrovascular accident) [facility]) [I63.9]  Payor: [place] MEDICARE / Plan: VA MEDICARE PART A & B / Product Type: Medicare /  Onset Date: 3/2020    Treatment Diagnosis: R13.12   Prior Hospitalization: see medical history Provider#: [id number]   Medications: Verified on Patient summary List    Comorbidities: hx of CVA,  Bell's Palsy, dysarthria, HTN, hyperlipidemia    Prior Level of Function: Dysphagia WNLs    The Plan of Care and following information is based on the information from the initial evaluation. Assessment/ key information: This 71f y/o male was referred to [address] [place] [address] c/o dysphagia, specifically with liquids. \"I cough when I drink\". Esophagram completed 3/2020, indicating x1 episode of silent aspiration. Clinical beside swallow eval completed per MD orders. Pt A&Ox4, able to follow commands. Speech/voice within functional limits, perceptually in quiet tx room; however pt also c/o'ing of hypophonia, warranting him to repeat himself often and causing frustration. Oral mech examination revealed structures functional for speech and deglutition: Natural dentition, asymmetrical lingual /labial structures with h/o CVA and Bell's Palsy (deviation to the Left), natural dentition, and min lingual/labial weakness  Pt observed with thin liquids +/- straw via single sips and successive swallows with timely swallow initiation, min weak laryngeal elevation to palpation/observation, but  no overt s/sx aspiration; no change in VQ. Pt accepted pudding with timely oral prep and A-P transit. He demo'd reduced and small motor movements when masticating regular textured solids.  Lingual residue ~ 25% noted at BOT; given min verbal cues/skilled instruction, pt able to clear effectively with liquid wash x1 without  any overt s/sx aspiration. *SLP recs: regular diet with thin liquids, meds as tolerated with general safe swallow precautions, handout given; additionally rec MBS to further assess phayngeal swallow structure/function and r/o silent aspiration. Pt educated with regard to s/sx aspiration, aspiration risk, diet recs, role of SLP, implementation of TBR ex for HEP, and limitations of esophagram when assessing for silent aspiration via esophagram . Pt receptive and agreeable. * Pt requests MBS be conducted [place] [facility] [phone]. SLP phoned Dr Isabela Villafuerte office reporting findings and recs. Nurse  Requests SLP send reports with request for MBS. Please send order for MBS with Speech therapy  to location stated above. Pt would benefit from skilled OP ST to address  mild oropharyngeal dysphagia and reported mild hypophonia (orders obtained hamilton ddress at another date). Problem List:      []aphasic  []dysarthric  [x]dysphagic       []alexic  []agraphic  []dysphonia       []dysfluency   []Cognitive-Linguistic Disorder       []other   Treatment Plan may include any combination of the following: Dysphagia Treatment, Treatment of Swallowing and Patient Education      Patient / Family readiness to learn indicated by: asking questions, trying to perform skills and interest    Persons(s) to be included in education:   patient (P)    Barriers to Learning/Limitations: None    Patient Goal (s): I cough on liquids at times    Patient Self Reported Health Status: good    Rehabilitation Potential: good    Short Term Goals: To be accomplished in 4 weeks  1.  Tolerate PO trials with 0 s/s overt distress in 4/5 trials to reduce aspiration risk  2. Recall and utilize compensatory swallow strategies/maneuvers (decrease bite/sip, size/rate, alt. liq/sol) with min cues in 4/5 trials to reduce aspiration risk  3. Perform oral-motor/laryngeal exercises to increase oropharyngeal swallow function with min cues.        Long Term Goals: To be accomplished in 4 weeks   1. Patient will consume a regular/ thin lqiuid diet utilizing compensatory swallowing strategies with jeff in 4/5 trials  without s/s of aspiration/ penetration to promote QOL, enhance hydration/ nutrition status, and reduce risk of aspiration. Frequency / Duration: Patient to be seen 1-2 times per week for 4 weeks:    Patient/ Caregiver education and instruction: Diagnosis, prognosis, Swallowing Precautions, Compensatory Techniques and Exercises,    Certification Period: 5/14/20-6/11/20    ROMI Cardenas 5/14/2020 10:09 AM  MA, [facility]-SLP  Speech-Language Pathologist    ________________________________________________________________________    I certify that the above Therapy Services are being furnished while the patient is under my care. I agree with the treatment plan and certify that this therapy is necessary.     Physician's Signature:____________Date:_________TIME:________    ** Signature, Date and Time must be completed for valid certification **    Please sign and return to [facility]  [address] [place] [address]  [phone] fax     Thank you

## 2020-05-14 NOTE — PROGRESS NOTES
ST DAILY TREATMENT NOTE    Patient Name: Praveena Holman  Date:2020  : 1945  [x]  Patient  Verified  Payor: VA MEDICARE / Plan: VA MEDICARE PART A & B / Product Type: Medicare /   In time:1000  Out time:1100  Total Treatment Time (min): 60  Visit #: 1 of 8    SUBJECTIVE  Pain Level (0-10 scale): 0  Any medication changes, allergies to medications, adverse drug reactions, diagnosis change, or new procedure performed?: [] No    [x] Yes (see summary sheet for update)  Subjective functional status/changes:   [] No changes reported  Pt referred to O/P ST d/t x1 episode of silent aspiration on esophagram (3/20). Pt reports dysphagia, specifically with liquids  With intermittent coughing. 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 cognition []lethargy/motivation [][place]   [x]? vision []language []Fatigue/pain []psych factors compensate with:   Dentition: [x]normal []abnormal []edentulous []dentures   Respiratory Status: [x]WFL []SOB  []O2 L/min:  []NC []Mask               []Trach Tube:                     []Excess secretions  Lips:  []Symmetrical [x]asymmetrical  Retraction []WFL  [x]?min []?mod []?max  Protrusion []WFL  [x]?min []?mod []?max  Strength []WFL  [x]?min []?mod []?max  Puff  [x]WFL  []?min []?mod []?max  Tongue:  []Symmetrical [x]asymmetrical  Protrusion []WFL  [x]?min []?mod []?max  Elevation [x]WFL  []?min []?mod []?max  Depression [x]WFL  []?min []?mod []?max  Lateralization [x]WFL  []?min []?mod []?max  Strength []WFL  [x]?min []?mod []?max  Velum:  [x]Symmetrical []asymmetrical  Gag Reflex:  [x]Present []absent []DNT  Sensation:  []Intact [x]Diminished  Specify: per esophagram ?? Silent aspiration? ? Voice:  [x]Normal []Hoarse []harsh  []Breathy []Hypernasal []hyponasal  []Gurgly Other:  Pt reports ongoing hypophonia, however, perceptually appropriate  In quiet tx room  Swallow:  [x]Volitional []absent  [x]Reflexive []absent  Cough   Strength : []WNL []Diminished Did not observe  []Volitional []absent  []Reflexive []absent  OBJECTIVE    OP SWALLOW EVALUATION    Observation of Swallow:  Thin Nectar Honey Puree  applesauce Solids  Mechanical soft fruit and grain bar/regular peanut butter cracker nabs Other  Mixed consistency canned fruit cocktail   Oral Phase x   x x    Anterior loss of bolus  (decreased labial seal [])         Decreased bolus formation  (?lingual ROM/Coord[])     x    Increased mastication     x    Increased oral transit time  (?A-P bolus transit[])    x x    Abnormal chewing         Tongue pumping/tremors         Pocketing  (?labial/buccal tension/lingual control)         Other         Oral Pharyngeal Phase         Delayed swallow initiation         Absent swallow         Stasis on lingual surface  (?lingual movement)     x    Adherence to hard palate   (? lingual elevation)         Pharyngeal Phase         Multiple swallows  (residue in pharynx [])         Coughing post swallow         Throat clear post swallow         Wet vocal quality  (residue on vocal cords [])         Reduced laryngeal elevation min   min min    Nasal Regurgitation  (? velopharyngeal closure)         Other           [] No symptoms of dysphagia evidenced  [x] Symptoms of dysphagia observed  [] Patient at risk for aspiration  [] Other:     Recommendations:  Diet:  [] NPO    [] Pureed [] Ground [] MechSoft [x] Regular  Liquids: [x] Water  [x] Regular [] Thickened   [] Mound  [] Honeythick  [] Pudding  Presentation: [x] Cup    [] Spoon [] Straw [] Alternate liquids and solids  Monitor: [x] Sitting up at 90 deg [] Reclined to:  [] Head turned to:    [] Chin tuck  [] Head tilt to:      [x] Seated upright post meals (min): 30  Document: [x] Coughing [x] Temperatures [] Lung Sounds    Videoflouroscopy: [x] Yes [] No  Dysphagia Treatment: [x] Yes [] No Sessions per week: 1-2 weekly  Other: Pt would also like to address hypophonia concerns/ reports frustration with constantly having to repeat him self    Remediation Techniques:  C = Compensatory techniques to use during meal      F = Facilitation/treatment techniques by SLP    [x] Supraglottic Swallow (c,f)    [] Oral motor exercises (f)  [] Super-supraglottic swallow (c,f)   [] Labial closure  [] Compensations for pocketing (c)   [] Lingual elevation  [x] Sweep mouth with tongue    [] Lingual lateralization  [] Sweep mouth with finger          Falsetto/laryngeal elevation exercises (f)  [] Discourage liquid wash between bites (c)  [] Thermal application (c,f)  [] Multiple swallows     [] Sour bolus (f)  [] Patient needs cues     [] Cold bolus (f)  [] Patient does not need cues   [] Pharyngeal exercise (f)  [x] Mendelsohn Maneuver (c,f)    [] Breath hold  [] Encourage/stimulate lip closure (c)   [x] Effortful Swallow (c,f)  [] Empty mouth before next bite (c)   [x] Tongue base retraction  [] Cue patient to slow down (c)    [x] Tongue hold  [] Encourage coughing (c)    [] Laryngeal closure  []Chin tuck (c)  []Head turn  (c)  []Head turn, chin tuck (c)    Patient/Caregiver instruction/education: [x] Review HEP    [] Progressed/Changed    HEP/Handouts given: HEP ex  Pain Level (0-10 scale) post treatment: 0    ASSESSMENT  [x]  See Plan of Care    PLAN  []  Upgrade activities as tolerated     [x]  Continue plan of care  []  Discharge due to:__  [x] Other:__ Order MBS; called Dr Cholo Camarena office    Rashad Kebede, SLP 5/14/2020  10:11 AM  [place], [id number] [facility]  Speech-Language Pathologist      Future Appointments   Date Time Provider Kirill Chaudhry   5/14/2020 10:00 AM [facility]

## 2020-05-21 ENCOUNTER — HOSPITAL ENCOUNTER (OUTPATIENT)
Dept: PHYSICAL THERAPY | Age: 75
Discharge: HOME | End: 2020-05-21
Payer: MEDICARE

## 2020-05-21 PROCEDURE — 92526 ORAL FUNCTION THERAPY: CPT

## 2020-05-21 NOTE — PROGRESS NOTES
ST DAILY TREATMENT NOTE    Patient Name: Edward Velazquez  Date:2020  : 1945  [x]  Patient  Verified  Payor: Payor: VA MEDICARE / Plan: VA MEDICARE PART A & B / Product Type: Medicare /   In time: 10:03 Out time:1045  Total Treatment Time (min): 42  Visit #: 2 of 8    Treatment Diagnosis: Dysarthria [R47.1]  CVA (cerebrovascular accident) ([address] [I63.9]    SUBJECTIVE  Pain Level (0-10 scale): 0  Any medication changes, allergies to medications, adverse drug reactions, diagnosis change, or new procedure performed?: [x] No    [] Yes (see summary sheet for update)    Subjective functional status/changes:   [] No changes reported  Pt reports he felt it was easier to complete Laura with water in his mouth. SLP re-educated pt to not complete with po, risk of aspiration, purpose/benefits. OBJECTIVE  Treatment provided includes:  Increase/Improve:  []  Voice Quality []  Cognitive Linguistic Skills [x]  Laryngeal/Pharyngeal Exercises   []  Vocal Loudness []  Reading Comprehension [x]  Swallowing Skills    []  Vocal Cord Function []  Auditory Comprehension []  Oral Motor Skills   []  Resonance []  Writing Skills [x]  Compensatory strategies    []  Speech Intelligibility []  Expressive Language []  Attention   []  Breath Support/Coord. []  Receptive language []  Memory   []  Articulation [x]  Safety Awareness    []  Fluency []  Word Retrieval [x] pt educaiton       Treatment Provided:  -Pt education re: implementation of mendelsohn maneuver with maxA, x10 reps holding 1-2 secs;  Supraglottic swallow with water x10 reps modA without any overt s/sx of aspiration  -Laura maneuver jeff x15   -Scheduling/pt education for [facility]  Patient/Caregiver  Education: [x] Review HEP      HEP/Handouts given: swallow ex (as above)    Pain Level (0-10 scale) post treatment:0    ASSESSMENT   []   Improving appropriately and progressing toward goals  [x]   Improving slowly and progressing toward goals  []   Approximating goals/maximum potential  [x]   Continues to benefit from skilled therapy to address remaining functional deficits  []   Not progressing toward goals and plan of care will be adjusted    Patient will continue to benefit from skilled therapy to address remaining functional deficits: dysphagia    Progress towards goals / Updated goals:  1. Tolerate PO trials with 0 s/s overt distress in 4/5 trials to reduce aspiration risk-progressing  2. Recall and utilize compensatory swallow strategies/maneuvers (decrease bite/sip, size/rate, alt. liq/sol) with min cues in 4/5 trials to reduce aspiration risk   3. Perform oral-motor/laryngeal exercises to increase oropharyngeal swallow function with min cues. -progressing        Long Term Goals: To be accomplished in 4 weeks   1.  Patient will consume a regular/ thin lqiuid diet utilizing compensatory swallowing strategies with jeff in 4/5 trials  without s/s of aspiration/ penetration to promote QOL, enhance hydration/ nutrition status, and reduce risk of aspiration.        PLAN  [x]  Continue plan of care  []  Modify Goals/Treatment Plan      []  Discharge due to:  [] Other:    ROMI Good 5/21/2020  9:59 AM  MA, CCC-SLP  Speech-Language Pathologist    Future Appointments   Date Time Provider Kirill Chaudhry   5/21/2020 10:00 AM Tami Melendez [facility]   5/28/2020 10:00 AM Tami Melendez [facility]

## 2020-05-27 ENCOUNTER — HOSPITAL ENCOUNTER (OUTPATIENT)
Dept: GENERAL RADIOLOGY | Age: 75
Discharge: HOME | End: 2020-05-27
Attending: INTERNAL MEDICINE

## 2020-05-27 DIAGNOSIS — R13.12 OROPHARYNGEAL DYSPHAGIA: ICD-10-CM

## 2020-05-28 ENCOUNTER — HOSPITAL ENCOUNTER (OUTPATIENT)
Dept: PHYSICAL THERAPY | Age: 75
Discharge: HOME | End: 2020-05-28
Payer: MEDICARE

## 2020-05-28 PROCEDURE — 92522 EVALUATE SPEECH PRODUCTION: CPT

## 2020-05-28 NOTE — PROGRESS NOTES
ST DAILY TREATMENT NOTE/VOICE EVALUATION    [facility] [address]  Ph: [phone]  Fax: [phone]    Updated Plan of Care/ Statement of Necessity for Speech Therapy Services Diagnostic Therapy Session    Patient name: Tara Sinha Start of Care: 2020   Referral source: Zada Schlatter, MD : 1945    Medical Diagnosis: Dysarthria [R47.1]  CVA (cerebrovascular accident) [facility]) [I63.9]   Onset Date:1 month prior    Treatment Diagnosis: dysphonia   Prior Hospitalization: see medical history Provider#: [id number]   Medications: Verified on Patient summary List    Comorbidities: CVA   Prior Level of Function: retired [employer]    Patient Name: Tara Sinha  Date:2020  : 1945  [x]  Patient  Verified  Payor: VA MEDICARE / Plan: VA MEDICARE PART A & B / Product Type: Medicare /   In time:1000  Out time:1040  Total Treatment Time (min): 40  Visit #: 3 of 8-10    SUBJECTIVE  Pain Level (0-10 scale): 0  Any medication changes, allergies to medications, adverse drug reactions, diagnosis change, or new procedure performed?: [x] No    [] Yes (see summary sheet for update)    Subjective functional status/changes:   [] No changes reported  Patient reports completing swallowing exercises    Radiology: n/a    OBJECTIVE  OUTPATIENT VOICE EVALUATION    Description of Problem: talk in my normal tone, \"it drops off\", hypophonia    Variability through Day: worse in the evenings  Voice Usage: 1-2 hours  Know Abuse/Misuse: sometimes clears throat (1-2 times a day), sometimes talks when tired, drinks 8 ounces of water a day, 1 alcoholic beverage a day.      Pitch:   [] WNL  [x] Low  [] High     Comments:  Loudness: [] WNL  [] Excessive [x] Inadequate     Comments: Quiet speech  Quality:  [x] WNL      Comments:  Resonance: [x] WNL  [] Hypernasal [] Hyponasal     Comments:  Rate:  [x] WNL  [] Fast  [] Slow     Comments:  Range:  [x] WNL  [] Montone [] Excessive variability    Comments:  Observations [] Pitch Breaks [] Glottal Pearson [] Stridency [] Hard Glottal Attacks    [] Aphonia [] Diplophonia [] Phonation Breaks     [] Severe Fluctuations on Quality    []Other:     [] Aphonia interspersed with intermittent phonation      Vowel prolongation /a/ (a measure of respiratory/phonatory efficiency):     Duration: 23.15  seconds; Intensity: 78 decreasing to 73 dB   Vocal quality: WNL          Vowel prolongation /i/ (ee):  Duration: 28.76  seconds; Intensity: 71 dB   Vocal quality: WNL            S/Z Ratio: S= 8.38 Z= 10.61 S/Z=    S= 12.19 Z= 18.12 S/z=    Frequency (as measured by chromatic tuner and keyboard):  Pitch Range:    Semi-tones         Lowest: F2   Highest:D#3   Modal pitch in conversation: A#3  Modal pitch in reading: A#3    Is breathing audible? [] Yes [x] No  Is phonation on inhalation? [] Yes [x] No  Is breathing pattern irregular? [] Yes [x] No   Does patient have difficulty sustaining expiration? [] Yes [x] No  Does patient focus on breathing? [] Yes [x] No  Clavicular breathing? [x] Yes [] No  Reduced respiratory/speech coordination? [] Yes [x] No    Frequent coughing? [] Yes [x] No   Frequent throat clearing? [] Yes [x] No   Extrinsic laryngeal tension? [] Yes [x] No  Visible tension present: []neck  [] chest  [] mandible  Evidence of tight throat during phonation? [] Yes [x] No  Complaints of tired throat?  [] Yes [x] No  Tone focus: []retracted tongue  [] closed mouth   [x]appropriate [] back throat focus    Perceptual assessment:  Consensus Auditory Perceptual Evaluation of Voice (CAPE-V) was administered by this SLP :On a scale of 0 to 100 with 100 equaling the most extreme deviance the patient attained the following scores: (see attached in paper chart):  Overall Severity: 4/100 consistent; minimally deviant, roughness: 0/10 not deviant , breathiness 0/100 consistent not deviant; strain: 0/100 consistent, not deviant; pitch (too love) 0/100 consistent; not deviant; loudness: (too soft)  8/100 inconsistent; mildly deviant. Patient completed Voice Handicap Index (VHI): (see attached in paper chart): This scores agreement or disagreement for statements that many people have used to describe their voices and the effects of their voices on their lives. It is self scored with score choices 0 through 4. Zero indicates 'never handicapping' and 4 indicating 'always handicapping'. The higher the score the more handicapping. There are 40 points possible for each of the 3 sections and also a total handicapping score of 120 points. For functional measure, pt attained a score of 10 points (mildly handicapping); for physical measure, pt scored 7 points (mildly handicapping); and for emotional measure, pt scored 3  points (minimally handicapping). Patient attained a total score of 18 points (mildly handicapping). The patient then self-rated a score of 5  on a 10 point scale (average talkative). Speech:   Oral Verbal Apraxia: [x] None     [] Mild [] Moderate [] Severe   Dysarthria:  [x] None     [] Mild [] Moderate [] Severe   Intelligibility  [x] WNL      [] Words [] Phrases [] Sentences       [] Conversation  % intelligible:   Agrammatic:  [] Yes       [x] No  Fluency:   [x]WNL  []Dysfluent   Comments:  Motor Speech: [x]Articulation WFL    []Apraxia  []oral []verbal  ( []min []mod []severe)    []Dysarthria    Intelligibility:  Deviations noted:   [x]none  []yes, describe:   Auditory Comprehension: [x] WFL [] Impaired - describe: (subjective)  Verbal Expression:   [x] WFL [] Impaired - describe: (subjective)  Reading comprehension: [x] WFL [] Impaired - describe: (subjective)  Cognitive skills:   [x] WFL [] Impaired - describe: (subjective)    Patient/Caregiver instruction/education: [x] Review HEP    [] Progressed/Changed    HEP/Handouts given: increase water intake, swallow exercises 2-3x per day    Pain Level (0-10 scale) post treatment: 0    ASSESSMENT  Patient presents with minimal dysphonia c/b decreased intensity in conversation. Patient ~63 dBSPL in conversation. Seld reports wife asks him to repeat several times. Problem List:   [address] [place] may include any combination of the following: Voice Treatment      Patient / Family readiness to learn indicated by: interest    Persons(s) to be included in education:   patient (P)    Barriers to Learning/Limitations: None    Patient Goal (s): For my wife to stop asking me to repeat myself    Patient Self Reported Health Status: good    Rehabilitation Potential: good    Short Term Goals: To be accomplished in 8-10 treatments  1. Tolerate PO trials with 0 s/s overt distress in 4/5 trials to reduce aspiration risk-progressing  2. Recall and utilize compensatory swallow strategies/maneuvers (decrease bite/sip, size/rate, alt. liq/sol) with min cues in 4/5 trials to reduce aspiration risk   3. Perform oral-motor/laryngeal exercises to increase oropharyngeal swallow function with min cues. -progressing  4. Increase water/decaff tea intake to 50oz a day in 6/7 days. 5. Perform diaphragmatic breathing for effective phonation and decreasing vocal tension with min A-mod I visual/verbal cues in 4/5 trials. 6. Complete increased pitch/phonation exercises with adequate breath support/coordination at sound, word, sentence, andconversation level to decrease pitch breaks in order to increase vocal quality in conversation in 4/5 trials given min A to include visual/verbal/tactile/kinesthetic cues/models       Long Term Goals: To be accomplished in 4 weeks   1. Patient will consume a regular/ thin lqiuid diet utilizing compensatory swallowing strategies with jeff in 4/5 trials  without s/s of aspiration/ penetration to promote QOL, enhance hydration/ nutrition status, and reduce risk of aspiration.   2. Pt will utilize best quality voice with least physical effort with good vocal hygiene and voice strategies with min A, for participation in communication ADLs and home/social activities. Frequency / Duration: Patient to be seen 1-2 times per week for 4 weeks: The severity rating is based on the following outcomes:    National Outcomes Measures (NOMS)   Voice Handicapp Index    Patient/ Caregiver education and instruction: Diagnosis, prognosis, Swallowing Precautions and Compensatory Techniques       PLAN  [x]  Upgrade activities as tolerated     [x]  Continue plan of care  []  Discharge due to:__  [] Other:__      Beryle Dresser [facility] SLP 5/28/2020 10:07 AM  ______________________________________________________________________    I certify that the above Therapy Services are being furnished while the patient is under my care. I agree with the treatment plan and certify that this therapy is necessary. Physician's Signature:____________________  Date:___________Time:_________    Please sign and return to [facility]  [facility].   Ph: [phone]  Fax: [phone]

## 2020-07-28 NOTE — PROGRESS NOTES
[facility]  [address]  Ph: [phone]  Fax: [phone]    Speech Therapy Discharge Summary    Patient name: Darian Alvarez Start of Care: 20   Referral source: Ti Irwin MD : 1945   Medical/Treatment Diagnosis: Dysarthria [R47.1]  CVA (cerebrovascular accident) [facility]) [I63.9] Onset Date: 3/2020     Prior Hospitalization: see medical history Provider#: [id number]   Medications: Verified on Patient Summary List    Comorbidities: hx of CVA,  Bell's Palsy, dysarthria, HTN, hyperlipidemia   Prior Level of Function:Dysphagia WNL    Visits from Start of Care: 3    Missed Visits: 0    Reporting Period : 20 to 20    Summary of Care:  Patient has attended speech therapy for 3 treatment sessions from May 14, 2020 to May 28, 2020 to address dysarthria and dysphonia. At this time, patient requests to abdicate from therapy. Will discharge accordingly.      RECOMMENDATIONS:  [x]Discontinue therapy: []Patient has reached or is progressing toward set goals      [x]Patient is non-compliant or has abdicated      []Due to lack of appreciable progress towards set goals    ROMI Fernandez 2020 4:13 PM

## 2021-04-22 ENCOUNTER — HOSPITAL ENCOUNTER (OUTPATIENT)
Dept: LAB | Age: 76
Discharge: HOME | End: 2021-04-22

## 2021-04-22 LAB
ANION GAP SERPL CALC-SCNC: 4 MMOL/L (ref 5–15)
BASOPHILS # BLD: 0 K/UL (ref 0–0.1)
BASOPHILS NFR BLD: 0 % (ref 0–1)
BUN SERPL-MCNC: 42 MG/DL (ref 6–20)
BUN/CREAT SERPL: 21 (ref 12–20)
CA-I BLD-MCNC: 8.7 MG/DL (ref 8.5–10.1)
CHLORIDE SERPL-SCNC: 104 MMOL/L (ref 97–108)
CO2 SERPL-SCNC: 29 MMOL/L (ref 21–32)
CREAT SERPL-MCNC: 1.98 MG/DL (ref 0.7–1.3)
DIFFERENTIAL METHOD BLD: ABNORMAL
EOSINOPHIL # BLD: 0.1 K/UL (ref 0–0.4)
EOSINOPHIL NFR BLD: 0 % (ref 0–7)
ERYTHROCYTE [DISTWIDTH] IN BLOOD BY AUTOMATED COUNT: 16.9 % (ref 11.5–14.5)
GLUCOSE SERPL-MCNC: 94 MG/DL (ref 65–100)
HCT VFR BLD AUTO: 52 % (ref 36.6–50.3)
HGB BLD-MCNC: 17 G/DL (ref 12.1–17)
IMM GRANULOCYTES # BLD AUTO: 0.2 K/UL (ref 0–0.04)
IMM GRANULOCYTES NFR BLD AUTO: 1 % (ref 0–0.5)
LYMPHOCYTES # BLD: 1.9 K/UL (ref 0.8–3.5)
LYMPHOCYTES NFR BLD: 7 % (ref 12–49)
MCH RBC QN AUTO: 29.1 PG (ref 26–34)
MCHC RBC AUTO-ENTMCNC: 32.7 G/DL (ref 30–36.5)
MCV RBC AUTO: 89 FL (ref 80–99)
MONOCYTES # BLD: 1.9 K/UL (ref 0–1)
MONOCYTES NFR BLD: 7 % (ref 5–13)
NEUTS SEG # BLD: 22.6 K/UL (ref 1.8–8)
NEUTS SEG NFR BLD: 85 % (ref 32–75)
PLATELET # BLD AUTO: 174 K/UL (ref 150–400)
PMV BLD AUTO: 10.1 FL (ref 8.9–12.9)
POTASSIUM SERPL-SCNC: 4.8 MMOL/L (ref 3.5–5.1)
RBC # BLD AUTO: 5.84 M/UL (ref 4.1–5.7)
SODIUM SERPL-SCNC: 137 MMOL/L (ref 136–145)
WBC # BLD AUTO: 26.5 K/UL (ref 4.1–11.1)

## 2021-04-22 PROCEDURE — 85025 COMPLETE CBC W/AUTO DIFF WBC: CPT

## 2021-04-22 PROCEDURE — 80048 BASIC METABOLIC PNL TOTAL CA: CPT

## 2021-06-01 PROBLEM — I10 BENIGN HYPERTENSION: Status: ACTIVE | Noted: 2020-02-10

## 2021-06-01 PROBLEM — G25.0 ESSENTIAL TREMOR: Status: ACTIVE | Noted: 2020-02-10

## 2021-06-01 PROBLEM — I63.9 CVA (CEREBRAL VASCULAR ACCIDENT) (HCC): Status: ACTIVE | Noted: 2020-02-10

## 2021-06-01 PROBLEM — R47.81 SLURRED SPEECH: Status: ACTIVE | Noted: 2019-04-12

## 2021-09-14 ENCOUNTER — HOSPITAL ENCOUNTER (OUTPATIENT)
Dept: PHYSICAL THERAPY | Age: 76
End: 2021-09-14